# Patient Record
Sex: FEMALE | Race: BLACK OR AFRICAN AMERICAN | NOT HISPANIC OR LATINO | Employment: UNEMPLOYED | URBAN - METROPOLITAN AREA
[De-identification: names, ages, dates, MRNs, and addresses within clinical notes are randomized per-mention and may not be internally consistent; named-entity substitution may affect disease eponyms.]

---

## 2019-03-15 ENCOUNTER — OFFICE VISIT (OUTPATIENT)
Dept: RHEUMATOLOGY | Facility: CLINIC | Age: 49
End: 2019-03-15
Payer: COMMERCIAL

## 2019-03-15 VITALS
DIASTOLIC BLOOD PRESSURE: 70 MMHG | HEART RATE: 55 BPM | HEIGHT: 64 IN | TEMPERATURE: 97 F | RESPIRATION RATE: 16 BRPM | WEIGHT: 252 LBS | BODY MASS INDEX: 43.02 KG/M2 | SYSTOLIC BLOOD PRESSURE: 120 MMHG

## 2019-03-15 DIAGNOSIS — G89.4 CHRONIC PAIN SYNDROME: ICD-10-CM

## 2019-03-15 DIAGNOSIS — M25.9: ICD-10-CM

## 2019-03-15 DIAGNOSIS — G62.9 PERIPHERAL NERVE DISEASE: Primary | ICD-10-CM

## 2019-03-15 DIAGNOSIS — R21 RASH: ICD-10-CM

## 2019-03-15 DIAGNOSIS — L40.9 PSORIASIS: ICD-10-CM

## 2019-03-15 DIAGNOSIS — G35 MULTIPLE SCLEROSIS: ICD-10-CM

## 2019-03-15 DIAGNOSIS — M23.92 INTERNAL DERANGEMENT OF LEFT KNEE: ICD-10-CM

## 2019-03-15 DIAGNOSIS — M54.16 LUMBAR RADICULOPATHY: ICD-10-CM

## 2019-03-15 DIAGNOSIS — Q87.19 SJOGREN-LARSSON SYNDROME: ICD-10-CM

## 2019-03-15 DIAGNOSIS — R76.8 POSITIVE ANTINUCLEAR ANTIBODY: ICD-10-CM

## 2019-03-15 PROBLEM — M79.673 FOOT PAIN: Status: ACTIVE | Noted: 2019-03-15

## 2019-03-15 PROBLEM — I63.9 CEREBROVASCULAR ACCIDENT: Status: ACTIVE | Noted: 2019-03-15

## 2019-03-15 PROBLEM — G56.03 BILATERAL CARPAL TUNNEL SYNDROME: Status: ACTIVE | Noted: 2018-08-01

## 2019-03-15 PROBLEM — R25.2 SPASTICITY: Status: ACTIVE | Noted: 2017-10-23

## 2019-03-15 PROBLEM — G43.909 MIGRAINE: Status: ACTIVE | Noted: 2019-03-15

## 2019-03-15 PROBLEM — G47.00 PERSISTENT INSOMNIA: Status: ACTIVE | Noted: 2019-03-15

## 2019-03-15 PROBLEM — M35.00 SJOGREN'S SYNDROME: Status: ACTIVE | Noted: 2018-11-01

## 2019-03-15 PROBLEM — K21.9 GASTROESOPHAGEAL REFLUX DISEASE: Status: ACTIVE | Noted: 2017-10-23

## 2019-03-15 PROCEDURE — 3008F PR BODY MASS INDEX (BMI) DOCUMENTED: ICD-10-PCS | Mod: CPTII,S$GLB,, | Performed by: INTERNAL MEDICINE

## 2019-03-15 PROCEDURE — 99214 PR OFFICE/OUTPT VISIT, EST, LEVL IV, 30-39 MIN: ICD-10-PCS | Mod: S$GLB,,, | Performed by: INTERNAL MEDICINE

## 2019-03-15 PROCEDURE — 3008F BODY MASS INDEX DOCD: CPT | Mod: CPTII,S$GLB,, | Performed by: INTERNAL MEDICINE

## 2019-03-15 PROCEDURE — 99214 OFFICE O/P EST MOD 30 MIN: CPT | Mod: S$GLB,,, | Performed by: INTERNAL MEDICINE

## 2019-03-15 RX ORDER — ALBUTEROL SULFATE 90 UG/1
AEROSOL, METERED RESPIRATORY (INHALATION)
COMMUNITY

## 2019-03-15 RX ORDER — CARISOPRODOL 350 MG/1
TABLET ORAL
COMMUNITY
End: 2022-05-25 | Stop reason: CLARIF

## 2019-03-15 RX ORDER — LOSARTAN POTASSIUM AND HYDROCHLOROTHIAZIDE 12.5; 5 MG/1; MG/1
TABLET ORAL
COMMUNITY

## 2019-03-15 RX ORDER — MELOXICAM 15 MG/1
15 TABLET ORAL DAILY
Qty: 30 TABLET | Refills: 3 | Status: SHIPPED | OUTPATIENT
Start: 2019-03-15

## 2019-03-15 RX ORDER — HYDROCODONE BITARTRATE AND ACETAMINOPHEN 10; 325 MG/1; MG/1
TABLET ORAL
COMMUNITY
End: 2022-08-01

## 2019-03-15 RX ORDER — IBUPROFEN 800 MG/1
TABLET ORAL
COMMUNITY

## 2019-03-15 RX ORDER — DEXTROAMPHETAMINE SACCHARATE, AMPHETAMINE ASPARTATE MONOHYDRATE, DEXTROAMPHETAMINE SULFATE AND AMPHETAMINE SULFATE 7.5; 7.5; 7.5; 7.5 MG/1; MG/1; MG/1; MG/1
30 CAPSULE, EXTENDED RELEASE ORAL EVERY MORNING
COMMUNITY

## 2019-03-15 RX ORDER — LIDOCAINE 50 MG/G
PATCH TOPICAL
COMMUNITY
End: 2022-06-28

## 2019-03-15 RX ORDER — ALPRAZOLAM 1 MG/1
TABLET ORAL
COMMUNITY

## 2019-03-15 RX ORDER — PANTOPRAZOLE SODIUM 40 MG/1
TABLET, DELAYED RELEASE ORAL
COMMUNITY

## 2019-03-15 RX ORDER — ESTRADIOL 0.1 MG/D
FILM, EXTENDED RELEASE TRANSDERMAL
COMMUNITY
End: 2022-06-28 | Stop reason: ALTCHOICE

## 2019-03-15 RX ORDER — ESTRADIOL 0.5 MG/1
TABLET ORAL
COMMUNITY

## 2019-03-15 NOTE — PROGRESS NOTES
Subjective:       Patient ID: Greta Castillo is a 48 y.o. female.    Chief Complaint: Follow-up    HPI no acutely swollen tender painful joint since last visit and injection in the hips temporaly after one week and pain recurred Pain more severe when lying down resting  Worse pain or standing too long  And continue on soma  1 tab tid. Developed a rsh in elbow and both legs and saw a Dermatologist and no biopsy known and treated for two months with injections and told she has Psoriasis and no treatment  Thereafter. Has a leg  Rash  remainining in leg a circular band like rash for one month. Continue on avonex for multiple sclerosis. Dryness in mouth and mouth  On Visine. No Gi upset with Ibuprofen  800 Bid with no GI upset  With Protonix    Current medications include:  Current Outpatient Medications on File Prior to Visit   Medication Sig Dispense Refill    albuterol (VENTOLIN HFA) 90 mcg/actuation inhaler Ventolin HFA 90 mcg/actuation aerosol inhaler      ALPRAZolam (XANAX) 1 MG tablet alprazolam 1 mg tablet   Take 1 tablet 3 times a day by oral route.      carisoprodol (SOMA) 350 MG tablet Soma 350 mg tablet   Take 1 tablet 3 times a day by oral route.      dextroamphetamine-amphetamine (ADDERALL XR) 30 MG 24 hr capsule Take 30 mg by mouth every morning.      estradiol (ESTRACE) 0.5 MG tablet estradiol 0.5 mg tablet      estradiol (VIVELLE-DOT) 0.1 mg/24 hr PTSW estradiol 0.1 mg/24 hr semiweekly transdermal patch      HYDROcodone-acetaminophen (NORCO)  mg per tablet Take 1 tab QID      ibuprofen (ADVIL,MOTRIN) 800 MG tablet ibuprofen 800 mg tablet   TAKE ONE TABLET BY MOUTH THREE TIMES DAILY      interferon beta-1a, albumin, (AVONEX, WITH ALBUMIN,) 30 mcg injection Once a week      lidocaine (LIDODERM) 5 % Apply 1 patch over painful joint area 12 hrs on and 12 hrs off.      losartan-hydrochlorothiazide 50-12.5 mg (HYZAAR) 50-12.5 mg per tablet Take 1 tab once daily      pantoprazole (PROTONIX)  "40 MG tablet Take 1 tab once daily       No current facility-administered medications on file prior to visit.        Lab Results:  No results found for: WBC, RBC, HGB, HCT, MCV, COLORU, SPECGRAV, PHUR, WBCUR, NITRITE, GLUCOSEUR, KETONESU, BILIRUBINUR, RBCUR, NA, K, CL, CO2, GLU, BUN, CREATININE     Review of Systems   Constitutional:        Has night sweat   HENT:        Dry eyes and mouth not symptomatic   Eyes: Negative.         Blurred vison  Recently will see Eye Dr   Respiratory: Negative.    Cardiovascular: Negative.    Gastrointestinal: Negative.    Endocrine: Negative.    Genitourinary: Negative.    Musculoskeletal: Positive for arthralgias and back pain.   Allergic/Immunologic: Negative.    Neurological: Positive for numbness.        Feet plantar area numbness and tingling   Hematological: Bruises/bleeds easily.   Psychiatric/Behavioral: Negative.          Objective:   /70 (Patient Position: Sitting, BP Method: Large (Manual))   Pulse (!) 55   Temp 96.6 °F (35.9 °C) (Temporal)   Resp 16   Ht 5' 4" (1.626 m)   Wt 114.3 kg (252 lb)   BMI 43.26 kg/m²      Physical Exam   Constitutional: She is well-developed, well-nourished, and in no distress.   HENT:   Drtyness in eyes and mouth   Neck:   Shoulder neck trigger points   Cardiovascular: Normal rate and regular rhythm.    Pulmonary/Chest: Effort normal and breath sounds normal.   Abdominal: Soft. Bowel sounds are normal.       Right Side Rheumatological Exam     Examination finds the 2nd MCP, 3rd MCP and 4th MCP normal.    The patient is tender to palpation of the shoulder, elbow, wrist, knee and 1st MCP    Shoulder Exam   Tenderness Location: subacromion    Knee Exam   Tenderness Location: lateral joint line    Hip Exam   Tenderness Location: lateral and greater trochanter    Left Side Rheumatological Exam     The patient is tender to palpation of the shoulder, elbow, wrist, knee, 2nd MCP, 3rd MCP and 4th MCP.    Shoulder Exam   Tenderness " Location: subacromion    Knee Exam   Tenderness Location: lateral joint line    Hip Exam   Tenderness Location: greater trochanter and lateral      Neurological:   SLR =Positive   Skin: Skin is warm and dry. Rash noted.     Psychiatric:   Bilateral dark sking rash circular aroun legs   Musculoskeletal:   Polyarthralgia and tenderss in multiple joint wrist , elbow, soulder, hips and knees bilaterally           Assessment:       1. Peripheral nerve disease    2. Multiple sclerosis    3. Lumbar radiculopathy    4. Chronic pain syndrome    5. Rash    6. Positive antinuclear antibody    7. Internal derangement of left knee    8. Disorder of hip region            Plan:       Start wearing Carpal Tunnel Syndrome braces will start Meloxica in place of Ibuprofen .

## 2021-05-12 ENCOUNTER — PATIENT MESSAGE (OUTPATIENT)
Dept: RESEARCH | Facility: HOSPITAL | Age: 51
End: 2021-05-12

## 2021-09-09 ENCOUNTER — HISTORICAL (OUTPATIENT)
Dept: SURGERY | Facility: HOSPITAL | Age: 51
End: 2021-09-09

## 2021-09-16 ENCOUNTER — HISTORICAL (OUTPATIENT)
Dept: SURGERY | Facility: HOSPITAL | Age: 51
End: 2021-09-16

## 2022-04-09 ENCOUNTER — HISTORICAL (OUTPATIENT)
Dept: ADMINISTRATIVE | Facility: HOSPITAL | Age: 52
End: 2022-04-09
Payer: MEDICARE

## 2022-04-25 VITALS
HEIGHT: 64 IN | DIASTOLIC BLOOD PRESSURE: 86 MMHG | WEIGHT: 254.19 LBS | BODY MASS INDEX: 43.4 KG/M2 | SYSTOLIC BLOOD PRESSURE: 138 MMHG

## 2022-04-30 NOTE — OP NOTE
Patient:   Greta Castillo             MRN: [id number]            FIN: [id number]               Age:   50 years     Sex:  Female     :  1970   Associated Diagnoses:   None   Author:   Gokul Duong MD      DATE OF SURGERY:    21    SURGEON:  Gokul Duong MD    PREOPERATIVE DIAGNOSIS:  Lumbar radiculopathy.    POSTOPERATIVE DIAGNOSIS:  Lumbar radiculopathy.    PROCEDURE PERFORMED:  Fluoroscopically-guided transforaminal lumbar epidural injections at left L3-4 and left L5-S1.    EQUIPMENT USED:  Epidural tray.    DESCRIPTION OF PROCEDURE:  Following informed consent, the patient was prepped and draped in the usual sterile fashion.  I infiltrated the tissue overlying L3-4 and L5-S1 with local anesthetic.  Under fluoroscopic guidance, I advanced a 22-gauge 3.5 inch BD spinal needle into the epidural space via left transforaminal approaches.  Positioning was confirmed at each location with administration of contrast.  I then instilled divided between the 2 needles a combination of 160 mg Depo-Medrol and 1 mL of 5% dextrose in water.  I removed the needles and applied sterile dressings.  The patient tolerated the procedure well without apparent complication.    IMPRESSION:  Successful fluoroscopically-guided transforaminal lumbar epidural injection at left L3-4 and left L5-S1.

## 2022-04-30 NOTE — OP NOTE
Patient:   Greta Castillo             MRN: [id number]            FIN: [id number]               Age:   50 years     Sex:  Female     :  1970   Associated Diagnoses:   None   Author:   Gokul Duong MD      DATE OF SURGERY:  21        SURGEON:  Gokul Duong MD    PREOPERATIVE DIAGNOSIS:  Cervical radiculopathy.    POSTOPERATIVE DIAGNOSE:  Cervical radiculopathy.    PROCEDURE PERFORMED:  Fluoroscopically-guided intralaminar cervical epidural injection at 5-6.    EQUIPMENT USED:  Epidural tray.    DETAILED DESCRIPTION:  Following informed consent, the patient was prepped and draped in the usual sterile fashion.  I infiltrated the tissue overlying C5-6 with local anesthetic.  Under fluoroscopic guidance, I advanced a 22-gauge 3.5 inch BD spinal needle into the epidural space.  Positioning was confirmed with administration of contrast.  I then instilled a combination of 160 mg Depo-Medrol and 1 mL of 5% dextrose in water.  I removed the needle and applied a sterile dressing.  The patient tolerated the procedure well without apparent complication.    IMPRESSION:  Successful fluoroscopically-guided intralaminar cervical epidural injection at C5-6.

## 2022-05-25 DIAGNOSIS — M54.9 CHRONIC BACK PAIN, UNSPECIFIED BACK LOCATION, UNSPECIFIED BACK PAIN LATERALITY: Primary | ICD-10-CM

## 2022-05-25 DIAGNOSIS — G89.29 CHRONIC BACK PAIN, UNSPECIFIED BACK LOCATION, UNSPECIFIED BACK PAIN LATERALITY: Primary | ICD-10-CM

## 2022-05-25 RX ORDER — CARISOPRODOL 350 MG/1
1 TABLET ORAL DAILY
COMMUNITY
Start: 2022-02-16 | End: 2022-05-25 | Stop reason: CLARIF

## 2022-05-25 RX ORDER — OXYCODONE AND ACETAMINOPHEN 10; 325 MG/1; MG/1
1 TABLET ORAL 3 TIMES DAILY PRN
COMMUNITY
Start: 2022-04-25 | End: 2022-05-25 | Stop reason: SDUPTHER

## 2022-05-25 RX ORDER — OXYCODONE AND ACETAMINOPHEN 10; 325 MG/1; MG/1
1 TABLET ORAL 3 TIMES DAILY PRN
Qty: 90 TABLET | Refills: 0 | Status: SHIPPED | OUTPATIENT
Start: 2022-05-25 | End: 2022-06-28

## 2022-05-25 RX ORDER — CARISOPRODOL 350 MG/1
350 TABLET ORAL DAILY
Qty: 30 TABLET | Refills: 0 | Status: SHIPPED | OUTPATIENT
Start: 2022-05-25 | End: 2022-06-27 | Stop reason: SDUPTHER

## 2022-06-14 NOTE — PROGRESS NOTES
Subjective:      Patient ID: Greta Castillo is a 51 y.o. female.    Chief Complaint: Disease Management    HPI:   Includes joint pain without subjective swelling.   Antecedent event includes: None;  Pain location includes: hips, right knee, right ankle, feet, neck and back;  Gradual onset; beginning >years ago;  Constant ache and numbness, Moderate in severity,   Lasting >minutes, Worse during the daytime;   Improved with change in position;   Worsened with activity and rest;     Review of Systems   Constitutional: Negative for fever and unexpected weight change.   HENT: Positive for tinnitus. Negative for mouth sores and trouble swallowing.    Eyes: Negative for redness.   Respiratory: Negative for cough and shortness of breath.    Cardiovascular: Positive for leg swelling. Negative for chest pain.   Gastrointestinal: Negative for constipation and diarrhea.   Genitourinary: Negative for dysuria and genital sores.   Musculoskeletal: Positive for arthralgias, back pain, myalgias and neck pain.   Integumentary:  Negative for rash.   Neurological: Positive for numbness, headaches and memory loss.   Hematological: Bruises/bleeds easily.   Psychiatric/Behavioral: Positive for sleep disturbance. The patient is nervous/anxious.       Past Medical History:   Diagnosis Date    Acid reflux     Anemia     Anxiety     Arthritis     Asthma     Depression     Hypertension     Stroke      Past Surgical History:   Procedure Laterality Date    CARPAL TUNNEL RELEASE Left      SECTION      CYSTOCELE REPAIR      HYSTERECTOMY      KNEE SURGERY      TUBAL LIGATION        See the Assessment/Plan for further characterization of the HPI, ROS, Medical, Surgical, Family, and Social Histories including Tobacco use, Meds; all of which has been reviewed in Epic.    Medication List with Changes/Refills   Current Medications    ALBUTEROL (PROVENTIL/VENTOLIN HFA) 90 MCG/ACTUATION INHALER    Ventolin HFA 90 mcg/actuation  "aerosol inhaler    ALPRAZOLAM (XANAX) 1 MG TABLET    alprazolam 1 mg tablet   Take 1 tablet 3 times a day by oral route.    CARISOPRODOL (SOMA) 350 MG TABLET    Take 1 tablet (350 mg total) by mouth Daily.    DEXTROAMPHETAMINE-AMPHETAMINE (ADDERALL XR) 30 MG 24 HR CAPSULE    Take 30 mg by mouth every morning.    ESTRADIOL (ESTRACE) 0.5 MG TABLET    estradiol 0.5 mg tablet    HYDROCODONE-ACETAMINOPHEN (NORCO)  MG PER TABLET    Take 1 tab QID    IBUPROFEN (ADVIL,MOTRIN) 800 MG TABLET    ibuprofen 800 mg tablet   TAKE ONE TABLET BY MOUTH THREE TIMES DAILY    LOSARTAN-HYDROCHLOROTHIAZIDE 50-12.5 MG (HYZAAR) 50-12.5 MG PER TABLET    Take 1 tab once daily    MELOXICAM (MOBIC) 15 MG TABLET    Take 1 tablet (15 mg total) by mouth once daily.    PANTOPRAZOLE (PROTONIX) 40 MG TABLET    Take 1 tab once daily   Discontinued Medications    ESTRADIOL (VIVELLE-DOT) 0.1 MG/24 HR PTSW    estradiol 0.1 mg/24 hr semiweekly transdermal patch    INTERFERON BETA-1A, ALBUMIN, (AVONEX, WITH ALBUMIN,) 30 MCG INJECTION    Once a week    LIDOCAINE (LIDODERM) 5 %    Apply 1 patch over painful joint area 12 hrs on and 12 hrs off.    OXYCODONE-ACETAMINOPHEN (PERCOCET)  MG PER TABLET    Take 1 tablet by mouth 3 (three) times daily as needed for Pain.         Objective:   /88   Pulse 76   Resp 16   Ht 5' 3" (1.6 m)   Wt 116.6 kg (257 lb)   SpO2 98%   BMI 45.53 kg/m²   Physical Exam  Non-toxic appearance. No distress.   Normocephalic and atraumatic.   External ears normal.    Conjunctivae and EOM are normal. No scleral icterus.   palpable distal pulses.    No tachypnea or signs of respiratory distress.   Neurological: Oriented. Normal thought content. No cranial nerve deficit.   Skin is warm. No pallor.   Musculoskeletal: see below for further input. No overt synovitis.     No image results found.    No visits with results within 1 Year(s) from this visit.   Latest known visit with results is:   No results found for any " previous visit.        All of the data above and below has been reviewed by myself and any further interpretations will be reflected in the assessment and plan.   The data includes review of external notes, and independent interpretation of lab results, x-rays, and imaging reports.  Active inflammatory arthritis is an illness that poses a threat to bodily function and even a threat to life in some cases.  Drug therapy to treat inflammatory arthritis usually requires intensive monitoring for toxicity.    Review of patient's allergies indicates:  No Known Allergies  Assessment/Plan:       There is some history to suggest inflammatory arthritis. But more so possible sjogren's syndrome mentioned in Epic diagnosis but not mentioned in Dr. Arias's note    She tells me she did not f/u with Dr. Arias because she had flare of MS and other noted    There is also some mechanical and neurogenic pain.    Neuropathy 'peripheral nerve disease' per records    MS currently working with Neurology Dr. Sevilla    Prior carpal tunnel    Prior hip surgery    Dr. Sepulveda    Iliotibial band syndrome    Lumbar radiculopathy per limited records    Consider also working with Physical Medicine and Rehab MD if not already    She is already working with an interventional Pain MD in [place] s/p cervical ROGELIO that did not help    Has been managing with:    meloxicam and ibuprofen and tells me GI only wants her to use the ibuprofen seldomly as   she was found to have stomach irritation which she will plan to clarify and should not combine    Oxycodone 10mg 325mg    Soma 350mg    Xanax    adderall    Has been working with:    Multiple sclerosis per very limited records in Epic    Previously on Avonex    Now on infusion with Dr. Sevilla    Prior CVA     No overt synovitis    Verbal education    We will seek records from PCP and Neurology and prior imaging reports from Pain Management    Blood work to further evaluate    Revisit weeks    Contact  us prn      Records Dr. Arias 3/15/2019: ((no acutely swollen tender painful joint since last visit and injection in the hips temporaly after one week and pain recurred Pain more severe when lying down resting  Worse pain or standing too long  And continue on soma  1 tab tid. Developed a rsh in elbow and both legs and saw a Dermatologist and no biopsy known and treated for two months with injections and told she has Psoriasis and no treatment  Thereafter. Has a leg  Rash  remainining in leg a circular band like rash for one month. Continue on avonex for multiple sclerosis. Dryness in mouth and mouth  On Visine. No Gi upset with Ibuprofen  800 Bid with no GI upset  With Protonix  Assessment:       1. Peripheral nerve disease    2. Multiple sclerosis    3. Lumbar radiculopathy    4. Chronic pain syndrome    5. Rash    6. Positive antinuclear antibody    7. Internal derangement of left knee    8. Disorder of hip region              Plan:       Start wearing Carpal Tunnel Syndrome braces will start Meloxica in place of Ibuprofen .    ))))      Review of medical records as stated above.  Lab +/- imaging and other ordered diagnostic studies to further evaluate.  See the orders associated with this note visit.  Medications as prescribed as tolerated.    Education including verbal and those noted in the patient instructions.  Revisit as scheduled and call prn.    The following diagnoses influence medical decision making and/or need further workup including the orders listed below:    Pain in other joint  -     CAITLIN by IFA, w/Rflx; Future; Expected date: 06/28/2022  -     Protein Electrophoresis, Serum; Future; Expected date: 06/28/2022  -     Quantiferon Gold TB; Future; Expected date: 06/28/2022  -     Rheumatoid Factor; Future; Expected date: 06/28/2022  -     Sedimentation rate; Future; Expected date: 06/28/2022  -     Uric Acid; Future; Expected date: 06/28/2022  -     Urinalysis; Future; Expected date: 06/28/2022  -      Cyclic Citrullinated Peptide Antibody, IgG; Future; Expected date: 06/28/2022  -     C-Reactive Protein; Future; Expected date: 06/28/2022  -     Comprehensive Metabolic Panel; Future; Expected date: 06/28/2022  -     CK; Future; Expected date: 06/28/2022  -     CBC Auto Differential; Future; Expected date: 06/28/2022  -     C4 Complement; Future; Expected date: 06/28/2022  -     C3 Complement; Future; Expected date: 06/28/2022  -     Hepatitis B Surface Antigen; Future; Expected date: 06/28/2022  -     Hepatitis B Core Antibody, Total; Future; Expected date: 06/28/2022  -     Hepatitis B Surface Ab, Qualitative; Future; Expected date: 06/28/2022  -     Hepatitis C Antibody; Future; Expected date: 06/28/2022    Polyarthralgia  -     CAITLIN by IFA, w/Rflx; Future; Expected date: 06/28/2022  -     Protein Electrophoresis, Serum; Future; Expected date: 06/28/2022  -     Quantiferon Gold TB; Future; Expected date: 06/28/2022  -     Rheumatoid Factor; Future; Expected date: 06/28/2022  -     Sedimentation rate; Future; Expected date: 06/28/2022  -     Uric Acid; Future; Expected date: 06/28/2022  -     Urinalysis; Future; Expected date: 06/28/2022  -     Cyclic Citrullinated Peptide Antibody, IgG; Future; Expected date: 06/28/2022  -     C-Reactive Protein; Future; Expected date: 06/28/2022  -     Comprehensive Metabolic Panel; Future; Expected date: 06/28/2022  -     CK; Future; Expected date: 06/28/2022  -     CBC Auto Differential; Future; Expected date: 06/28/2022  -     C4 Complement; Future; Expected date: 06/28/2022  -     C3 Complement; Future; Expected date: 06/28/2022  -     Hepatitis B Surface Antigen; Future; Expected date: 06/28/2022  -     Hepatitis B Core Antibody, Total; Future; Expected date: 06/28/2022  -     Hepatitis B Surface Ab, Qualitative; Future; Expected date: 06/28/2022  -     Hepatitis C Antibody; Future; Expected date: 06/28/2022    Cervical spondylosis    Other osteoarthritis involving  multiple joints  -     CAITLIN by IFA, w/Rflx; Future; Expected date: 06/28/2022  -     Protein Electrophoresis, Serum; Future; Expected date: 06/28/2022  -     Quantiferon Gold TB; Future; Expected date: 06/28/2022  -     Rheumatoid Factor; Future; Expected date: 06/28/2022  -     Sedimentation rate; Future; Expected date: 06/28/2022  -     Uric Acid; Future; Expected date: 06/28/2022  -     Urinalysis; Future; Expected date: 06/28/2022  -     Cyclic Citrullinated Peptide Antibody, IgG; Future; Expected date: 06/28/2022  -     C-Reactive Protein; Future; Expected date: 06/28/2022  -     Comprehensive Metabolic Panel; Future; Expected date: 06/28/2022  -     CK; Future; Expected date: 06/28/2022  -     CBC Auto Differential; Future; Expected date: 06/28/2022  -     C4 Complement; Future; Expected date: 06/28/2022  -     C3 Complement; Future; Expected date: 06/28/2022  -     Hepatitis B Surface Antigen; Future; Expected date: 06/28/2022  -     Hepatitis B Core Antibody, Total; Future; Expected date: 06/28/2022  -     Hepatitis B Surface Ab, Qualitative; Future; Expected date: 06/28/2022  -     Hepatitis C Antibody; Future; Expected date: 06/28/2022    Lumbar degenerative disc disease    Multiple sclerosis    Neuralgia    Myalgia    Screening for rheumatic disorder  -     CAITLIN by IFA, w/Rflx; Future; Expected date: 06/28/2022  -     Protein Electrophoresis, Serum; Future; Expected date: 06/28/2022  -     Quantiferon Gold TB; Future; Expected date: 06/28/2022  -     Rheumatoid Factor; Future; Expected date: 06/28/2022  -     Sedimentation rate; Future; Expected date: 06/28/2022  -     Uric Acid; Future; Expected date: 06/28/2022  -     Urinalysis; Future; Expected date: 06/28/2022  -     Cyclic Citrullinated Peptide Antibody, IgG; Future; Expected date: 06/28/2022  -     C-Reactive Protein; Future; Expected date: 06/28/2022  -     Comprehensive Metabolic Panel; Future; Expected date: 06/28/2022  -     CK; Future; Expected  date: 06/28/2022  -     CBC Auto Differential; Future; Expected date: 06/28/2022  -     C4 Complement; Future; Expected date: 06/28/2022  -     C3 Complement; Future; Expected date: 06/28/2022  -     Hepatitis B Surface Antigen; Future; Expected date: 06/28/2022  -     Hepatitis B Core Antibody, Total; Future; Expected date: 06/28/2022  -     Hepatitis B Surface Ab, Qualitative; Future; Expected date: 06/28/2022  -     Hepatitis C Antibody; Future; Expected date: 06/28/2022    History of carpal tunnel syndrome    Screening-pulmonary TB  -     CAITLIN by IFA, w/Rflx; Future; Expected date: 06/28/2022  -     Protein Electrophoresis, Serum; Future; Expected date: 06/28/2022  -     Quantiferon Gold TB; Future; Expected date: 06/28/2022  -     Rheumatoid Factor; Future; Expected date: 06/28/2022  -     Sedimentation rate; Future; Expected date: 06/28/2022  -     Uric Acid; Future; Expected date: 06/28/2022  -     Urinalysis; Future; Expected date: 06/28/2022  -     Cyclic Citrullinated Peptide Antibody, IgG; Future; Expected date: 06/28/2022  -     C-Reactive Protein; Future; Expected date: 06/28/2022  -     Comprehensive Metabolic Panel; Future; Expected date: 06/28/2022  -     CK; Future; Expected date: 06/28/2022  -     CBC Auto Differential; Future; Expected date: 06/28/2022  -     C4 Complement; Future; Expected date: 06/28/2022  -     C3 Complement; Future; Expected date: 06/28/2022  -     Hepatitis B Surface Antigen; Future; Expected date: 06/28/2022  -     Hepatitis B Core Antibody, Total; Future; Expected date: 06/28/2022  -     Hepatitis B Surface Ab, Qualitative; Future; Expected date: 06/28/2022  -     Hepatitis C Antibody; Future; Expected date: 06/28/2022    Encounter for hepatitis C screening test for low risk patient  -     CAITLIN by IFA, w/Rflx; Future; Expected date: 06/28/2022  -     Protein Electrophoresis, Serum; Future; Expected date: 06/28/2022  -     Quantiferon Gold TB; Future; Expected date:  06/28/2022  -     Rheumatoid Factor; Future; Expected date: 06/28/2022  -     Sedimentation rate; Future; Expected date: 06/28/2022  -     Uric Acid; Future; Expected date: 06/28/2022  -     Urinalysis; Future; Expected date: 06/28/2022  -     Cyclic Citrullinated Peptide Antibody, IgG; Future; Expected date: 06/28/2022  -     C-Reactive Protein; Future; Expected date: 06/28/2022  -     Comprehensive Metabolic Panel; Future; Expected date: 06/28/2022  -     CK; Future; Expected date: 06/28/2022  -     CBC Auto Differential; Future; Expected date: 06/28/2022  -     C4 Complement; Future; Expected date: 06/28/2022  -     C3 Complement; Future; Expected date: 06/28/2022  -     Hepatitis B Surface Antigen; Future; Expected date: 06/28/2022  -     Hepatitis B Core Antibody, Total; Future; Expected date: 06/28/2022  -     Hepatitis B Surface Ab, Qualitative; Future; Expected date: 06/28/2022  -     Hepatitis C Antibody; Future; Expected date: 06/28/2022      Follow up in about 4 weeks (around 7/26/2022).     Stevan Pompa MD

## 2022-06-27 DIAGNOSIS — M54.9 CHRONIC BACK PAIN, UNSPECIFIED BACK LOCATION, UNSPECIFIED BACK PAIN LATERALITY: ICD-10-CM

## 2022-06-27 DIAGNOSIS — G89.29 CHRONIC BACK PAIN, UNSPECIFIED BACK LOCATION, UNSPECIFIED BACK PAIN LATERALITY: ICD-10-CM

## 2022-06-28 ENCOUNTER — OFFICE VISIT (OUTPATIENT)
Dept: RHEUMATOLOGY | Facility: CLINIC | Age: 52
End: 2022-06-28
Payer: MEDICARE

## 2022-06-28 VITALS
DIASTOLIC BLOOD PRESSURE: 88 MMHG | HEIGHT: 63 IN | OXYGEN SATURATION: 98 % | BODY MASS INDEX: 45.54 KG/M2 | SYSTOLIC BLOOD PRESSURE: 132 MMHG | RESPIRATION RATE: 16 BRPM | WEIGHT: 257 LBS | HEART RATE: 76 BPM

## 2022-06-28 DIAGNOSIS — M51.36 LUMBAR DEGENERATIVE DISC DISEASE: ICD-10-CM

## 2022-06-28 DIAGNOSIS — M15.8 OTHER OSTEOARTHRITIS INVOLVING MULTIPLE JOINTS: ICD-10-CM

## 2022-06-28 DIAGNOSIS — Z11.59 ENCOUNTER FOR HEPATITIS C SCREENING TEST FOR LOW RISK PATIENT: ICD-10-CM

## 2022-06-28 DIAGNOSIS — M25.59 PAIN IN OTHER JOINT: Primary | ICD-10-CM

## 2022-06-28 DIAGNOSIS — M79.10 MYALGIA: ICD-10-CM

## 2022-06-28 DIAGNOSIS — M47.812 CERVICAL SPONDYLOSIS: ICD-10-CM

## 2022-06-28 DIAGNOSIS — Z13.89 SCREENING FOR RHEUMATIC DISORDER: ICD-10-CM

## 2022-06-28 DIAGNOSIS — G35 MULTIPLE SCLEROSIS: ICD-10-CM

## 2022-06-28 DIAGNOSIS — Z11.1 SCREENING-PULMONARY TB: ICD-10-CM

## 2022-06-28 DIAGNOSIS — M79.2 NEURALGIA: ICD-10-CM

## 2022-06-28 DIAGNOSIS — M25.50 POLYARTHRALGIA: ICD-10-CM

## 2022-06-28 DIAGNOSIS — Z86.69 HISTORY OF CARPAL TUNNEL SYNDROME: ICD-10-CM

## 2022-06-28 PROCEDURE — 99204 OFFICE O/P NEW MOD 45 MIN: CPT | Mod: S$GLB,,, | Performed by: INTERNAL MEDICINE

## 2022-06-28 PROCEDURE — 99204 PR OFFICE/OUTPT VISIT, NEW, LEVL IV, 45-59 MIN: ICD-10-PCS | Mod: S$GLB,,, | Performed by: INTERNAL MEDICINE

## 2022-06-29 ENCOUNTER — TELEPHONE (OUTPATIENT)
Dept: NEUROLOGY | Facility: CLINIC | Age: 52
End: 2022-06-29
Payer: MEDICARE

## 2022-06-29 RX ORDER — OXYCODONE AND ACETAMINOPHEN 10; 325 MG/1; MG/1
1 TABLET ORAL 3 TIMES DAILY PRN
Qty: 90 TABLET | Refills: 0 | Status: SHIPPED | OUTPATIENT
Start: 2022-06-29 | End: 2022-08-01 | Stop reason: SDUPTHER

## 2022-06-29 RX ORDER — CARISOPRODOL 350 MG/1
350 TABLET ORAL DAILY
Qty: 30 TABLET | Refills: 0 | Status: SHIPPED | OUTPATIENT
Start: 2022-06-29

## 2022-07-18 NOTE — PROGRESS NOTES
Subjective:      Patient ID: Greta Castillo is a 51 y.o. female.    Chief Complaint: Disease Management    HPI:   Includes joint pain without subjective swelling.   Antecedent event includes: None;  Pain location includes: joints;  Gradual onset; beginning >years ago;  Constant ache, Mild in severity,   Lasting >minutes, Worse during the daytime;   Improved with rest, medication, change in position and none;   Worsened with activity and overuse;     Review of Systems   Constitutional: Negative for chills, fatigue and fever.   HENT: Positive for tinnitus. Negative for nasal congestion, ear pain, hearing loss, mouth dryness, mouth sores, nosebleeds and trouble swallowing.    Eyes: Negative for photophobia, pain, redness, visual disturbance and eye dryness.   Respiratory: Negative for cough and shortness of breath.    Cardiovascular: Negative for chest pain, palpitations and leg swelling.   Gastrointestinal: Negative for abdominal distention, abdominal pain, blood in stool, constipation, diarrhea, rectal pain, vomiting and fecal incontinence.   Endocrine: Negative for polydipsia and polyuria.   Genitourinary: Negative for bladder incontinence, dysuria, enuresis, genital sores and hematuria.   Musculoskeletal: Positive for arthralgias and myalgias. Negative for joint swelling.   Integumentary:  Negative for rash, wound and mole/lesion.   Neurological: Positive for dizziness, headaches and memory loss. Negative for weakness.   Hematological: Negative for adenopathy. Bruises/bleeds easily.   Psychiatric/Behavioral: Positive for sleep disturbance. Negative for dysphoric mood. The patient is nervous/anxious.       Past Medical History:   Diagnosis Date    Acid reflux     Anemia     Anxiety     Arthritis     Asthma     Depression     Hypertension     Stroke      Past Surgical History:   Procedure Laterality Date    CARPAL TUNNEL RELEASE Left      SECTION      CYSTOCELE REPAIR      HYSTERECTOMY       "KNEE SURGERY      TUBAL LIGATION        See the Assessment/Plan for further characterization of the HPI, ROS, Medical, Surgical, Family, and Social Histories including Tobacco use, Meds; all of which has been reviewed in Epic.    Medication List with Changes/Refills   New Medications    POTASSIUM CHLORIDE SA (K-DUR,KLOR-CON) 20 MEQ TABLET    Take 1 tablet (20 mEq total) by mouth once daily.   Current Medications    ALBUTEROL (PROVENTIL/VENTOLIN HFA) 90 MCG/ACTUATION INHALER    Ventolin HFA 90 mcg/actuation aerosol inhaler    ALPRAZOLAM (XANAX) 1 MG TABLET    alprazolam 1 mg tablet   Take 1 tablet 3 times a day by oral route.    CARISOPRODOL (SOMA) 350 MG TABLET    Take 1 tablet (350 mg total) by mouth Daily.    DEXTROAMPHETAMINE-AMPHETAMINE (ADDERALL XR) 30 MG 24 HR CAPSULE    Take 30 mg by mouth every morning.    ESTRADIOL (ESTRACE) 0.5 MG TABLET    estradiol 0.5 mg tablet    IBUPROFEN (ADVIL,MOTRIN) 800 MG TABLET    ibuprofen 800 mg tablet   TAKE ONE TABLET BY MOUTH THREE TIMES DAILY    LOSARTAN-HYDROCHLOROTHIAZIDE 50-12.5 MG (HYZAAR) 50-12.5 MG PER TABLET    Take 1 tab once daily    MELOXICAM (MOBIC) 15 MG TABLET    Take 1 tablet (15 mg total) by mouth once daily.    OXYCODONE-ACETAMINOPHEN (PERCOCET) 5-325 MG PER TABLET        PANTOPRAZOLE (PROTONIX) 40 MG TABLET    Take 1 tab once daily   Discontinued Medications    HYDROCODONE-ACETAMINOPHEN (NORCO)  MG PER TABLET    Take 1 tab QID    OXYCODONE-ACETAMINOPHEN (PERCOCET)  MG PER TABLET    Take 1 tablet by mouth 3 (three) times daily as needed for Pain.         Objective:   /67   Pulse 72   Resp 16   Ht 5' 3" (1.6 m)   Wt 117.9 kg (260 lb)   SpO2 98%   BMI 46.06 kg/m²   Physical Exam  Non-toxic appearance. No distress.   Normocephalic and atraumatic.   External ears normal.    Conjunctivae and EOM are normal. No scleral icterus.   RRR, No friction rub; palpable distal pulses.    No tachypnea or signs of respiratory distress. " CTAB.  Abdominal: No guarding or rebound tenderness.   Neurological: Oriented. Normal thought content. No cranial nerve deficit. Strength is grossly normal without focal deficit.  Skin is warm. No pallor.   Musculoskeletal: DJD. see below for further input. No overt synovitis.     No image results found.    No visits with results within 1 Year(s) from this visit.   Latest known visit with results is:   No results found for any previous visit.        All of the data above and below has been reviewed by myself and any further interpretations will be reflected in the assessment and plan.   The data includes review of external notes, and independent interpretation of lab results, x-rays, and imaging reports.  Active inflammatory arthritis is an illness that poses a threat to bodily function and even a threat to life in some cases.  Drug therapy to treat inflammatory arthritis usually requires intensive monitoring for toxicity.    Review of patient's allergies indicates:  No Known Allergies  Assessment/Plan:       Prev noted/prior plan:   (No overt synovitis     Verbal education     We will seek records from PCP and Neurology and prior imaging reports from Pain Management     Blood work to further evaluate     Revisit weeks     Contact us prn)    This visit:    Interim lab 7/12/22:  SPEP alpha 2 borderline high 1.18;  Ig levels normal    CPK 60    C3 191; C4 19    WBC 9.6; Hgb 12.4; plt 371    UA SG 1.02    Creat 0.78; alb 4.1    K 3.4    CAITLIN neg RF neg CCP neg HepBsAgnegsAbneg coreAb neg HCV neg  Uric acid 5.3    2022 TB negative    ESR 48 CRP not done?    Interim records from PCP and Neurology and prior imaging reports from Pain Management:     Records PCP Dr. Rendon some of note 7/12/22: A/P: anxiety, xanax; ADHD amphetamine; insomnia zolpidem 10mg; Multiple sclerosis; MRI brain; OA right knee; refer to pain management    Records Martha Brown NP Cardiology some of note 6/21/22: revisit HTN, she was seen month  prior during acute illness with bronchitis; was SOB and chest discomfort; sent ER and ruled out MI; I/P: HTN, HLD, MS excerbation; Multiple sclerosis; monitor BP at home and report back if consistently greater than 140/90; revisit 6 months    3/21/22 MRI brain: linear bands of high signal intensity along septal callosal interface and bilateral; demyelinating lesions without enhancement consistent with lack of flare up.    7/27/22 MRI brain: continued areas of increased signal in white matter consistent with MS similar to prior exam    Records Neurology Dr. Sevilla some of note 2/28/22:  Relapsing/remitting MS; currently receiving Ocrevus infusions every 6 months; I/P: Multiple sclerosis; cont Ocrevus infusions every 6 months; Occipital neuralgia; MRI cervical spine; start carbamazepine 200mg bid    2/23/22 EMG/NCS UE: mild carpal tunnel       meloxicam and ibuprofen and tells me GI only wants her to use the ibuprofen seldomly as   she was found to have stomach irritation which she will plan to clarify and should not combine   Oxycodone 10mg 325mg   Soma 350mg   Xanax   adderall    She has some interim pain in joints and muscles    No overt synovitis    Went over lab and records: it looks like she is rather getting Ocrevus infusions every 6 months? She tells me yes    I am not seeing active inflammatory arthritis or connective tissue disease on exam or lab noted.    Verbal education fairly extensive on optimizing management of her mechanical pain; The medications only help so much.    Will give her temporary K    Revisit lab 2 weeks prior    Contact us prn        CAITLIN neg RF neg CCP neg HepBsAgnegsAbneg coreAb neg HCV neg  Uric acid 5.3  There is some history to suggest inflammatory arthritis. But more so possible sjogren's syndrome mentioned in Epic diagnosis but not mentioned in Dr. Arias's note     She tells me she did not f/u with Dr. Arias because she had flare of MS and other noted     There is also some  mechanical and neurogenic pain.     Neuropathy 'peripheral nerve disease' per records     MS currently working with Neurology Dr. Sevilla     Prior carpal tunnel     Prior hip surgery     Dr. Sepulveda     Iliotibial band syndrome     Lumbar radiculopathy per limited records     Consider also working with Physical Medicine and Rehab MD if not already     She is already working with an interventional Pain MD in [place] s/p cervical ROGELIO that did not help     Has been managing with:     meloxicam and ibuprofen and tells me GI only wants her to use the ibuprofen seldomly as   she was found to have stomach irritation which she will plan to clarify and should not combine     Oxycodone 10mg 325mg     Soma 350mg     Xanax     adderall     Has been working with:     Multiple sclerosis per very limited records in Epic     Previously on Avonex     Now on infusion with Dr. Sevilla     Ocrevus infusions every 6 months    Prior CVA     Prev noted/prior plan:   (No overt synovitis     Verbal education     We will seek records from PCP and Neurology and prior imaging reports from Pain Management     Blood work to further evaluate     Revisit weeks     Contact us prn)        Records Dr. Arias 3/15/2019: ((no acutely swollen tender painful joint since last visit and injection in the hips temporaly after one week and pain recurred Pain more severe when lying down resting  Worse pain or standing too long  And continue on soma  1 tab tid. Developed a rsh in elbow and both legs and saw a Dermatologist and no biopsy known and treated for two months with injections and told she has Psoriasis and no treatment  Thereafter. Has a leg  Rash  remainining in leg a circular band like rash for one month. Continue on avonex for multiple sclerosis. Dryness in mouth and mouth  On Visine. No Gi upset with Ibuprofen  800 Bid with no GI upset  With Protonix  Assessment:       1. Peripheral nerve disease    2. Multiple sclerosis    3. Lumbar  radiculopathy    4. Chronic pain syndrome    5. Rash    6. Positive antinuclear antibody    7. Internal derangement of left knee    8. Disorder of hip region              Plan:       Start wearing Carpal Tunnel Syndrome braces will start Meloxica in place of Ibuprofen .    ))))     Review of medical records as stated above.  Lab +/- imaging and other ordered diagnostic studies to further evaluate.  See the orders associated with this note visit.  Medications as prescribed as tolerated.    Education including verbal and those noted in the patient instructions.  Revisit as scheduled and call prn.    The following diagnoses influence medical decision making and/or need further workup including the orders listed below:    Pain in other joint  -     CAITLIN by IFA, w/Rflx; Future; Expected date: 11/16/2022  -     Comprehensive Metabolic Panel; Future; Expected date: 11/16/2022  -     C-Reactive Protein; Future; Expected date: 11/16/2022  -     C3 Complement; Future; Expected date: 11/16/2022  -     C4 Complement; Future; Expected date: 11/16/2022  -     CBC Auto Differential; Future; Expected date: 11/16/2022  -     Urinalysis Microscopic; Future; Expected date: 11/16/2022  -     Sedimentation rate; Future; Expected date: 11/16/2022  -     CK; Future; Expected date: 11/16/2022    Polyarthralgia  -     CAITLIN by IFA, w/Rflx; Future; Expected date: 11/16/2022  -     Comprehensive Metabolic Panel; Future; Expected date: 11/16/2022  -     C-Reactive Protein; Future; Expected date: 11/16/2022  -     C3 Complement; Future; Expected date: 11/16/2022  -     C4 Complement; Future; Expected date: 11/16/2022  -     CBC Auto Differential; Future; Expected date: 11/16/2022  -     Urinalysis Microscopic; Future; Expected date: 11/16/2022  -     Sedimentation rate; Future; Expected date: 11/16/2022  -     CK; Future; Expected date: 11/16/2022    Cervical spondylosis  -     CAITLIN by IFA, w/Rflx; Future; Expected date: 11/16/2022  -      Comprehensive Metabolic Panel; Future; Expected date: 11/16/2022  -     C-Reactive Protein; Future; Expected date: 11/16/2022  -     C3 Complement; Future; Expected date: 11/16/2022  -     C4 Complement; Future; Expected date: 11/16/2022  -     CBC Auto Differential; Future; Expected date: 11/16/2022  -     Urinalysis Microscopic; Future; Expected date: 11/16/2022  -     Sedimentation rate; Future; Expected date: 11/16/2022  -     CK; Future; Expected date: 11/16/2022    Lumbar degenerative disc disease  -     CAITLIN by IFA, w/Rflx; Future; Expected date: 11/16/2022  -     Comprehensive Metabolic Panel; Future; Expected date: 11/16/2022  -     C-Reactive Protein; Future; Expected date: 11/16/2022  -     C3 Complement; Future; Expected date: 11/16/2022  -     C4 Complement; Future; Expected date: 11/16/2022  -     CBC Auto Differential; Future; Expected date: 11/16/2022  -     Urinalysis Microscopic; Future; Expected date: 11/16/2022  -     Sedimentation rate; Future; Expected date: 11/16/2022  -     CK; Future; Expected date: 11/16/2022    Other osteoarthritis involving multiple joints  -     CAITLIN by IFA, w/Rflx; Future; Expected date: 11/16/2022  -     Comprehensive Metabolic Panel; Future; Expected date: 11/16/2022  -     C-Reactive Protein; Future; Expected date: 11/16/2022  -     C3 Complement; Future; Expected date: 11/16/2022  -     C4 Complement; Future; Expected date: 11/16/2022  -     CBC Auto Differential; Future; Expected date: 11/16/2022  -     Urinalysis Microscopic; Future; Expected date: 11/16/2022  -     Sedimentation rate; Future; Expected date: 11/16/2022  -     CK; Future; Expected date: 11/16/2022    Multiple sclerosis  -     CAITLIN by IFA, w/Rflx; Future; Expected date: 11/16/2022  -     Comprehensive Metabolic Panel; Future; Expected date: 11/16/2022  -     C-Reactive Protein; Future; Expected date: 11/16/2022  -     C3 Complement; Future; Expected date: 11/16/2022  -     C4 Complement; Future;  Expected date: 11/16/2022  -     CBC Auto Differential; Future; Expected date: 11/16/2022  -     Urinalysis Microscopic; Future; Expected date: 11/16/2022  -     Sedimentation rate; Future; Expected date: 11/16/2022  -     CK; Future; Expected date: 11/16/2022    Myalgia  -     CAITLIN by IFA, w/Rflx; Future; Expected date: 11/16/2022  -     Comprehensive Metabolic Panel; Future; Expected date: 11/16/2022  -     C-Reactive Protein; Future; Expected date: 11/16/2022  -     C3 Complement; Future; Expected date: 11/16/2022  -     C4 Complement; Future; Expected date: 11/16/2022  -     CBC Auto Differential; Future; Expected date: 11/16/2022  -     Urinalysis Microscopic; Future; Expected date: 11/16/2022  -     Sedimentation rate; Future; Expected date: 11/16/2022  -     CK; Future; Expected date: 11/16/2022    Screening for rheumatic disorder  -     CAITLIN by IFA w/Rflx; Future; Expected date: 11/16/2022  -     Comprehensive Metabolic Panel; Future; Expected date: 11/16/2022  -     C-Reactive Protein; Future; Expected date: 11/16/2022  -     C3 Complement; Future; Expected date: 11/16/2022  -     C4 Complement; Future; Expected date: 11/16/2022  -     CBC Auto Differential; Future; Expected date: 11/16/2022  -     Urinalysis Microscopic; Future; Expected date: 11/16/2022  -     Sedimentation rate; Future; Expected date: 11/16/2022  -     CK; Future; Expected date: 11/16/2022    Neuralgia  -     CAITLIN by IFA, w/Rflx; Future; Expected date: 11/16/2022  -     Comprehensive Metabolic Panel; Future; Expected date: 11/16/2022  -     C-Reactive Protein; Future; Expected date: 11/16/2022  -     C3 Complement; Future; Expected date: 11/16/2022  -     C4 Complement; Future; Expected date: 11/16/2022  -     CBC Auto Differential; Future; Expected date: 11/16/2022  -     Urinalysis Microscopic; Future; Expected date: 11/16/2022  -     Sedimentation rate; Future; Expected date: 11/16/2022  -     CK; Future; Expected date:  11/16/2022    History of carpal tunnel syndrome  -     CAITLIN by IFA, w/Rflx; Future; Expected date: 11/16/2022  -     Comprehensive Metabolic Panel; Future; Expected date: 11/16/2022  -     C-Reactive Protein; Future; Expected date: 11/16/2022  -     C3 Complement; Future; Expected date: 11/16/2022  -     C4 Complement; Future; Expected date: 11/16/2022  -     CBC Auto Differential; Future; Expected date: 11/16/2022  -     Urinalysis Microscopic; Future; Expected date: 11/16/2022  -     Sedimentation rate; Future; Expected date: 11/16/2022  -     CK; Future; Expected date: 11/16/2022    Hypokalemia  -     CAITLIN by IFA, w/Rflx; Future; Expected date: 11/16/2022  -     Comprehensive Metabolic Panel; Future; Expected date: 11/16/2022  -     C-Reactive Protein; Future; Expected date: 11/16/2022  -     C3 Complement; Future; Expected date: 11/16/2022  -     C4 Complement; Future; Expected date: 11/16/2022  -     CBC Auto Differential; Future; Expected date: 11/16/2022  -     Urinalysis Microscopic; Future; Expected date: 11/16/2022  -     Sedimentation rate; Future; Expected date: 11/16/2022  -     CK; Future; Expected date: 11/16/2022  -     potassium chloride SA (K-DUR,KLOR-CON) 20 MEQ tablet; Take 1 tablet (20 mEq total) by mouth once daily.  Dispense: 60 tablet; Refill: 0      Follow up in about 4 months (around 12/1/2022).     Stevan Pompa MD

## 2022-08-01 ENCOUNTER — OFFICE VISIT (OUTPATIENT)
Dept: RHEUMATOLOGY | Facility: CLINIC | Age: 52
End: 2022-08-01
Payer: MEDICARE

## 2022-08-01 VITALS
DIASTOLIC BLOOD PRESSURE: 67 MMHG | HEART RATE: 72 BPM | HEIGHT: 63 IN | BODY MASS INDEX: 46.07 KG/M2 | RESPIRATION RATE: 16 BRPM | SYSTOLIC BLOOD PRESSURE: 126 MMHG | OXYGEN SATURATION: 98 % | WEIGHT: 260 LBS

## 2022-08-01 DIAGNOSIS — M51.36 LUMBAR DEGENERATIVE DISC DISEASE: ICD-10-CM

## 2022-08-01 DIAGNOSIS — Z86.69 HISTORY OF CARPAL TUNNEL SYNDROME: ICD-10-CM

## 2022-08-01 DIAGNOSIS — M15.8 OTHER OSTEOARTHRITIS INVOLVING MULTIPLE JOINTS: ICD-10-CM

## 2022-08-01 DIAGNOSIS — M25.59 PAIN IN OTHER JOINT: Primary | ICD-10-CM

## 2022-08-01 DIAGNOSIS — M25.50 POLYARTHRALGIA: ICD-10-CM

## 2022-08-01 DIAGNOSIS — Z13.89 SCREENING FOR RHEUMATIC DISORDER: ICD-10-CM

## 2022-08-01 DIAGNOSIS — G35 MULTIPLE SCLEROSIS: ICD-10-CM

## 2022-08-01 DIAGNOSIS — E87.6 HYPOKALEMIA: ICD-10-CM

## 2022-08-01 DIAGNOSIS — M79.10 MYALGIA: ICD-10-CM

## 2022-08-01 DIAGNOSIS — M79.2 NEURALGIA: ICD-10-CM

## 2022-08-01 DIAGNOSIS — M47.812 CERVICAL SPONDYLOSIS: ICD-10-CM

## 2022-08-01 PROCEDURE — 99214 OFFICE O/P EST MOD 30 MIN: CPT | Mod: S$GLB,,, | Performed by: INTERNAL MEDICINE

## 2022-08-01 PROCEDURE — 99214 PR OFFICE/OUTPT VISIT, EST, LEVL IV, 30-39 MIN: ICD-10-PCS | Mod: S$GLB,,, | Performed by: INTERNAL MEDICINE

## 2022-08-01 RX ORDER — POTASSIUM CHLORIDE 20 MEQ/1
20 TABLET, EXTENDED RELEASE ORAL DAILY
Qty: 60 TABLET | Refills: 0 | Status: SHIPPED | OUTPATIENT
Start: 2022-08-01 | End: 2022-09-30

## 2022-08-01 RX ORDER — OXYCODONE AND ACETAMINOPHEN 5; 325 MG/1; MG/1
TABLET ORAL
COMMUNITY
Start: 2022-07-26

## 2024-02-14 DIAGNOSIS — N39.41 URGE INCONTINENCE OF URINE: Primary | ICD-10-CM

## 2024-03-18 ENCOUNTER — OFFICE VISIT (OUTPATIENT)
Dept: UROLOGY | Facility: CLINIC | Age: 54
End: 2024-03-18
Payer: MEDICARE

## 2024-03-18 VITALS
BODY MASS INDEX: 47.84 KG/M2 | OXYGEN SATURATION: 96 % | HEIGHT: 63 IN | WEIGHT: 270 LBS | RESPIRATION RATE: 18 BRPM | DIASTOLIC BLOOD PRESSURE: 80 MMHG | SYSTOLIC BLOOD PRESSURE: 134 MMHG | HEART RATE: 53 BPM

## 2024-03-18 DIAGNOSIS — N39.41 URGE INCONTINENCE OF URINE: ICD-10-CM

## 2024-03-18 DIAGNOSIS — R33.9 INCOMPLETE BLADDER EMPTYING: Primary | ICD-10-CM

## 2024-03-18 LAB
BILIRUB SERPL-MCNC: NEGATIVE MG/DL
BLOOD URINE, POC: NEGATIVE
COLOR, POC UA: YELLOW
GLUCOSE UR QL STRIP: NEGATIVE
KETONES UR QL STRIP: NEGATIVE
LEUKOCYTE ESTERASE URINE, POC: NEGATIVE
NITRITE, POC UA: NEGATIVE
PH, POC UA: 7
POC RESIDUAL URINE VOLUME: 127 ML (ref 0–100)
PROTEIN, POC: NEGATIVE
SPECIFIC GRAVITY, POC UA: 1.02
UROBILINOGEN, POC UA: 1

## 2024-03-18 PROCEDURE — 3079F DIAST BP 80-89 MM HG: CPT | Mod: CPTII,,, | Performed by: UROLOGY

## 2024-03-18 PROCEDURE — 81001 URINALYSIS AUTO W/SCOPE: CPT | Mod: PBBFAC | Performed by: UROLOGY

## 2024-03-18 PROCEDURE — 1160F RVW MEDS BY RX/DR IN RCRD: CPT | Mod: CPTII,,, | Performed by: UROLOGY

## 2024-03-18 PROCEDURE — 3075F SYST BP GE 130 - 139MM HG: CPT | Mod: CPTII,,, | Performed by: UROLOGY

## 2024-03-18 PROCEDURE — 51798 US URINE CAPACITY MEASURE: CPT | Mod: PBBFAC | Performed by: UROLOGY

## 2024-03-18 PROCEDURE — 99214 OFFICE O/P EST MOD 30 MIN: CPT | Mod: PBBFAC | Performed by: UROLOGY

## 2024-03-18 PROCEDURE — 1159F MED LIST DOCD IN RCRD: CPT | Mod: CPTII,,, | Performed by: UROLOGY

## 2024-03-18 PROCEDURE — 3008F BODY MASS INDEX DOCD: CPT | Mod: CPTII,,, | Performed by: UROLOGY

## 2024-03-18 PROCEDURE — 99204 OFFICE O/P NEW MOD 45 MIN: CPT | Mod: S$PBB,,, | Performed by: UROLOGY

## 2024-03-18 RX ORDER — NAPROXEN SODIUM 220 MG/1
TABLET, FILM COATED ORAL
COMMUNITY
Start: 2024-02-22

## 2024-03-18 RX ORDER — FERROUS SULFATE 325(65) MG
325 TABLET, DELAYED RELEASE (ENTERIC COATED) ORAL 2 TIMES DAILY
COMMUNITY

## 2024-03-18 RX ORDER — TAMSULOSIN HYDROCHLORIDE 0.4 MG/1
0.4 CAPSULE ORAL DAILY
Qty: 90 CAPSULE | Refills: 3 | Status: SHIPPED | OUTPATIENT
Start: 2024-03-18 | End: 2025-03-18

## 2024-03-18 NOTE — PROGRESS NOTES
CC:  Incontinence    HPI:  Greta Castillo is a 53 y.o. female seen in consultation for incontinence.  She is a former patient of mine last seen prior to 2020.  She had a TVT in 2011 and then had some problems with pulling sensation so a urethrolysis was done around 2014.  She has nocturia four to five times voiding small amounts.  She has diurnal frequency as well.  She doesn't feel like the bladder is emptying.  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      Bladder scan residual:  127 ml    Urinalysis:  Results for orders placed or performed in visit on 03/18/24   POCT URINE DIPSTICK WITH MICROSCOPE, AUTOMATED   Result Value Ref Range    Color, UA Yellow     Spec Grav UA 1.025     pH, UA 7.0     WBC, UA Negative     Nitrite, UA Negative     Protein, POC Negative     Glucose, UA Negative     Ketones, UA Negative     Urobilinogen, UA 1.0     Bilirubin, POC Negative     Blood, UA Negative      Microscopic Urinalysis:  WBC:   None per HPF     RBC:    None per HPF    Bacteria:    None per HPF     Squamous epithelial cells:    None per HPF       Crystals:   None     Data Review:  Note from referring provider, Prasad Rendon MD dated 7 March 2024.     ROS:  All systems reviewed and are negative except as documented in HPI and/or Assessment and Plan.     Patient Active Problem List:     Patient Active Problem List   Diagnosis    Sjogren's syndrome    Persistent insomnia    Positive antinuclear antibody    Multiple sclerosis    Spasticity    Rash    Peripheral nerve disease    Cerebrovascular accident    Migraine    Lumbar radiculopathy    Internal derangement of left knee    Gastroesophageal reflux disease    Foot pain    Disorder of hip region    Chronic pain syndrome    Bilateral carpal tunnel syndrome        Past Medical History:  Past Medical History:   Diagnosis Date    Acid reflux     Anemia     Anxiety     Arthritis     Asthma     Depression     Hypertension     Stroke         Past  Surgical History:  Past Surgical History:   Procedure Laterality Date    CARPAL TUNNEL RELEASE Left      SECTION      CYSTOCELE REPAIR      HYSTERECTOMY      KNEE SURGERY      TUBAL LIGATION          Family History:  Family History   Problem Relation Age of Onset    Cancer Mother     Diabetes Mellitus Mother     Hypertension Mother     Cancer Father         Social History:  Social History     Socioeconomic History    Marital status:    Tobacco Use    Smoking status: Never    Smokeless tobacco: Never   Substance and Sexual Activity    Alcohol use: No    Drug use: Yes     Types: Hydrocodone        Allergies:  Review of patient's allergies indicates:  No Known Allergies     Objective:  Vitals:    24 0932   BP: 134/80   Pulse: (!) 53   Resp: 18     General:  Well developed, well nourished adult female in no acute distress  Abdomen: Soft, nontender, no masses  Extremities:  No clubbing, cyanosis, or edema  Neurologic:  Grossly intact  Musculoskeletal:  Normal tone    Assessment:  1. Urge incontinence of urine  - Ambulatory referral/consult to Urology  - POCT URINE DIPSTICK WITH MICROSCOPE, AUTOMATED  - POCT Bladder Scan    2. Incomplete bladder emptying  - tamsulosin (FLOMAX) 0.4 mg Cap; Take 1 capsule (0.4 mg total) by mouth once daily.  Dispense: 90 capsule; Refill: 3     Plan:  Observation of the incontinence for now.  We will work on the incomplete emptying and proceed from there.  Given tamsulosin for incomplete bladder emptying.  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    Follow-up:    One month with bladder scan.

## 2024-03-18 NOTE — PROGRESS NOTES
Patient seen by Dr. SHAHEEN Deras. Will return in one month with a bladder scan. Written and verbal discharge instructions given.

## 2024-04-18 ENCOUNTER — OFFICE VISIT (OUTPATIENT)
Dept: UROLOGY | Facility: CLINIC | Age: 54
End: 2024-04-18
Payer: MEDICARE

## 2024-04-18 VITALS
TEMPERATURE: 98 F | WEIGHT: 267.38 LBS | HEART RATE: 73 BPM | BODY MASS INDEX: 47.38 KG/M2 | HEIGHT: 63 IN | RESPIRATION RATE: 20 BRPM | SYSTOLIC BLOOD PRESSURE: 129 MMHG | OXYGEN SATURATION: 100 % | DIASTOLIC BLOOD PRESSURE: 92 MMHG

## 2024-04-18 DIAGNOSIS — R33.9 INCOMPLETE BLADDER EMPTYING: Primary | ICD-10-CM

## 2024-04-18 DIAGNOSIS — N39.41 URGE INCONTINENCE OF URINE: ICD-10-CM

## 2024-04-18 LAB
BILIRUB SERPL-MCNC: NEGATIVE MG/DL
BLOOD URINE, POC: NEGATIVE
COLOR, POC UA: YELLOW
GLUCOSE UR QL STRIP: NEGATIVE
KETONES UR QL STRIP: NEGATIVE
LEUKOCYTE ESTERASE URINE, POC: NEGATIVE
NITRITE, POC UA: NEGATIVE
PH, POC UA: 7
POC RESIDUAL URINE VOLUME: 63 ML (ref 0–100)
PROTEIN, POC: NEGATIVE
SPECIFIC GRAVITY, POC UA: 1.02
UROBILINOGEN, POC UA: 0.2

## 2024-04-18 PROCEDURE — 99214 OFFICE O/P EST MOD 30 MIN: CPT | Mod: PBBFAC | Performed by: UROLOGY

## 2024-04-18 PROCEDURE — 1160F RVW MEDS BY RX/DR IN RCRD: CPT | Mod: CPTII,,, | Performed by: UROLOGY

## 2024-04-18 PROCEDURE — 3008F BODY MASS INDEX DOCD: CPT | Mod: CPTII,,, | Performed by: UROLOGY

## 2024-04-18 PROCEDURE — 81001 URINALYSIS AUTO W/SCOPE: CPT | Mod: PBBFAC | Performed by: UROLOGY

## 2024-04-18 PROCEDURE — 3074F SYST BP LT 130 MM HG: CPT | Mod: CPTII,,, | Performed by: UROLOGY

## 2024-04-18 PROCEDURE — 51798 US URINE CAPACITY MEASURE: CPT | Mod: PBBFAC | Performed by: UROLOGY

## 2024-04-18 PROCEDURE — 1159F MED LIST DOCD IN RCRD: CPT | Mod: CPTII,,, | Performed by: UROLOGY

## 2024-04-18 PROCEDURE — 99214 OFFICE O/P EST MOD 30 MIN: CPT | Mod: S$PBB,,, | Performed by: UROLOGY

## 2024-04-18 PROCEDURE — 3080F DIAST BP >= 90 MM HG: CPT | Mod: CPTII,,, | Performed by: UROLOGY

## 2024-04-18 RX ORDER — MIRABEGRON 50 MG/1
50 TABLET, EXTENDED RELEASE ORAL DAILY
Qty: 90 TABLET | Refills: 3 | Status: SHIPPED | OUTPATIENT
Start: 2024-04-18 | End: 2025-04-18

## 2024-04-18 NOTE — PROGRESS NOTES
CC:  Follow-up    HPI:  Greta Castillo is a 53 y.o. female here for follow-up.  She is a former patient of mine last seen prior to 2020.  She had a TVT in 2011 and then had some problems with pulling sensation so a urethrolysis was done around 2014.  She was having nocturia four to five times voiding small amounts as well as diurnal frequency and not feeling like the bladder is emptying.  She has multiple sclerosis and recently had a bout of Guillain [place].  At the last visit I placed her on tamsulosin.  She took this for a while but has been off of it for about two weeks.  She was taking some medication for an upper respiratory infection and she felt sick while she was taking both of those medications so she stopped the tamsulosin.  She is now having urgency and urge incontinence in addition to the urinary frequency.    Bladder scan residual:  63 ml    Urinalysis:    Results for orders placed or performed in visit on 04/18/24   POCT URINE DIPSTICK WITH MICROSCOPE, AUTOMATED   Result Value Ref Range    Color, UA Yellow     Spec Grav UA 1.020     pH, UA 7.0     WBC, UA Negative     Nitrite, UA Negative     Protein, POC Negative     Glucose, UA Negative     Ketones, UA Negative     Urobilinogen, UA 0.2     Bilirubin, POC Negative     Blood, UA Negative      Microscopic Urinalysis:  WBC:    None per HPF     RBC:    None per HPF     Bacteria:    None per HPF     Squamous epithelial cells:    None per HPF      Crystals:   None    ROS:  All systems reviewed and are negative except as documented in HPI and/or Assessment and Plan.     Patient Active Problem List:     Patient Active Problem List   Diagnosis    Sjogren's syndrome    Persistent insomnia    Positive antinuclear antibody    Multiple sclerosis    Spasticity    Rash    Peripheral nerve disease    Cerebrovascular accident    Migraine    Lumbar radiculopathy    Internal derangement of left knee    Gastroesophageal reflux disease    Foot pain    Disorder of hip  region    Chronic pain syndrome    Bilateral carpal tunnel syndrome        Past Medical History:  Past Medical History:   Diagnosis Date    Acid reflux     Anemia     Anxiety     Arthritis     Asthma     Depression     Hypertension     Stroke         Past Surgical History:  Past Surgical History:   Procedure Laterality Date    CARPAL TUNNEL RELEASE Left      SECTION      CYSTOCELE REPAIR      HYSTERECTOMY      KNEE SURGERY      TUBAL LIGATION          Family History:  Family History   Problem Relation Name Age of Onset    Cancer Mother      Diabetes Mellitus Mother      Hypertension Mother      Cancer Father          Social History:  Social History     Socioeconomic History    Marital status:    Tobacco Use    Smoking status: Never     Passive exposure: Never    Smokeless tobacco: Never   Substance and Sexual Activity    Alcohol use: No    Drug use: Yes     Types: Hydrocodone     Social Determinants of Health     Food Insecurity: No Food Insecurity (2023)    Received from [facility]    Hunger Vital Sign     Worried About Running Out of Food in the Last Year: Never true     Ran Out of Food in the Last Year: Never true   Transportation Needs: Not At Risk (2023)    Received from [facility]    Transportation Needs     In the past 12 months, has lack of reliable transportation kept you from medical appointments, meetings, work or from getting things needed for daily living?: No        Allergies:  Review of patient's allergies indicates:  No Known Allergies     Objective:  Vitals:    24 1521   BP: (!) 129/92   Pulse: 73   Resp: 20   Temp: 98.1 °F (36.7 °C)     General:  Well developed, well nourished adult female in no acute distress  Abdomen: Soft, nontender, no masses  Extremities:  No clubbing, cyanosis, or edema  Neurologic:  Grossly intact  Musculoskeletal:  Normal tone    Assessment:  1. Incomplete bladder emptying  - POCT URINE  DIPSTICK WITH MICROSCOPE, AUTOMATED  - POCT Bladder Scan    2. Urge incontinence of urine  - mirabegron (MYRBETRIQ) 50 mg Tb24; Take 1 tablet (50 mg total) by mouth once daily.  Dispense: 90 tablet; Refill: 3     Plan:  She is emptying the bladder well at this time.  We will schedule her for a cystoscopy.  With the urge incontinence and the urinary frequency I have placed her on Myrbetriq to see if this is due to overactive bladder.    Follow-up:    Six weeks for cystoscopy.

## 2024-06-13 ENCOUNTER — PROCEDURE VISIT (OUTPATIENT)
Dept: UROLOGY | Facility: CLINIC | Age: 54
End: 2024-06-13
Payer: MEDICARE

## 2024-06-13 VITALS
OXYGEN SATURATION: 99 % | BODY MASS INDEX: 49.39 KG/M2 | DIASTOLIC BLOOD PRESSURE: 91 MMHG | HEART RATE: 73 BPM | HEIGHT: 62 IN | TEMPERATURE: 98 F | SYSTOLIC BLOOD PRESSURE: 136 MMHG | WEIGHT: 268.38 LBS | RESPIRATION RATE: 20 BRPM

## 2024-06-13 DIAGNOSIS — R33.9 INCOMPLETE BLADDER EMPTYING: Primary | ICD-10-CM

## 2024-06-13 DIAGNOSIS — N39.41 URGE INCONTINENCE OF URINE: ICD-10-CM

## 2024-06-13 LAB
BILIRUB SERPL-MCNC: NORMAL MG/DL
BLOOD URINE, POC: NORMAL
COLOR, POC UA: YELLOW
GLUCOSE UR QL STRIP: NORMAL
KETONES UR QL STRIP: NORMAL
LEUKOCYTE ESTERASE URINE, POC: NORMAL
NITRITE, POC UA: NORMAL
PH, POC UA: 6
PROTEIN, POC: NORMAL
SPECIFIC GRAVITY, POC UA: 1.02
UROBILINOGEN, POC UA: 0.2

## 2024-06-13 PROCEDURE — 52000 CYSTOURETHROSCOPY: CPT | Mod: PBBFAC | Performed by: UROLOGY

## 2024-06-13 PROCEDURE — 52000 CYSTOURETHROSCOPY: CPT | Mod: S$PBB,,, | Performed by: UROLOGY

## 2024-06-13 PROCEDURE — 99212 OFFICE O/P EST SF 10 MIN: CPT | Mod: 25,S$PBB,, | Performed by: UROLOGY

## 2024-06-13 PROCEDURE — 81001 URINALYSIS AUTO W/SCOPE: CPT | Mod: PBBFAC | Performed by: UROLOGY

## 2024-06-13 RX ORDER — CIPROFLOXACIN 500 MG/1
500 TABLET ORAL
Status: COMPLETED | OUTPATIENT
Start: 2024-06-13 | End: 2024-06-13

## 2024-06-13 RX ORDER — SULFAMETHOXAZOLE AND TRIMETHOPRIM 800; 160 MG/1; MG/1
1 TABLET ORAL 2 TIMES DAILY
COMMUNITY

## 2024-06-13 RX ORDER — LIDOCAINE HYDROCHLORIDE 20 MG/ML
JELLY TOPICAL
Status: COMPLETED | OUTPATIENT
Start: 2024-06-13 | End: 2024-06-13

## 2024-06-13 RX ADMIN — CIPROFLOXACIN 500 MG: 500 TABLET ORAL at 11:06

## 2024-06-13 RX ADMIN — LIDOCAINE HYDROCHLORIDE: 20 JELLY TOPICAL at 11:06

## 2024-06-13 NOTE — PROGRESS NOTES
Pt seen by Dr. Schafer. Cysto performed in clinic. Consents signed and obtained by staff. Pt received medication per procedure protocol. Ciprofloxacin HCl tablet 500 mg & LIDOcaine HCl 2% urojet administered and tolerated well. RTC 1 MNTH . Pt education given both written and verbal.

## 2024-06-16 NOTE — PROCEDURES
CC:  Cystoscopy    HPI:  Greta Castillo is a 53 y.o. female here for cystoscopy for incomplete bladder emptying.  She is a former patient of mine last seen prior to 2020.  She had a TVT in 2011 and then had some problems with pulling sensation so a urethrolysis was done around 2014.  She was having nocturia four to five times voiding small amounts as well as diurnal frequency and not feeling like the bladder is emptying.  She has multiple sclerosis and recently had a bout of Guillain [place].  She was tried on tamsulosin but stopped taking it because she was on another medication and she was feeling sick so he stopped the tamsulosin.  She did not feel like it was doing much good while she was on it..  She is now having urgency and urge incontinence in addition to the urinary frequency.  She has been on Myrbetriq but this has not made a difference in the urinary frequency.    Urinalysis:  Results for orders placed or performed in visit on 06/13/24   POCT URINE DIPSTICK WITH MICROSCOPE, AUTOMATED   Result Value Ref Range    Color, UA Yellow     Spec Grav UA 1.025     pH, UA 6.0     WBC, UA neg     Nitrite, UA neg     Protein, POC neg     Glucose, UA neg     Ketones, UA neg     Urobilinogen, UA 0.2     Bilirubin, POC neg     Blood, UA neg        Microscopic Urinalysis:  WBC:   None per HPF     RBC:    None per HPF     Bacteria:    None per HPF     Squamous epithelial cells:    None per HPF  Crystals:   None     ROS:  All systems reviewed and are negative except as documented in HPI and/or Assessment and Plan.     Patient Active Problem List:     Patient Active Problem List   Diagnosis    Sjogren's syndrome    Persistent insomnia    Positive antinuclear antibody    Multiple sclerosis    Spasticity    Rash    Peripheral nerve disease    Cerebrovascular accident    Migraine    Lumbar radiculopathy    Internal derangement of left knee    Gastroesophageal reflux disease    Foot pain    Disorder of hip region    Chronic  pain syndrome    Bilateral carpal tunnel syndrome        Past Medical History:  Past Medical History:   Diagnosis Date    Acid reflux     Anemia     Anxiety     Arthritis     Asthma     Depression     Hypertension     Stroke         Past Surgical History:  Past Surgical History:   Procedure Laterality Date    CARPAL TUNNEL RELEASE Left      SECTION      CYSTOCELE REPAIR      HYSTERECTOMY      KNEE SURGERY      TUBAL LIGATION          Family History:  Family History   Problem Relation Name Age of Onset    Cancer Mother      Diabetes Mellitus Mother      Hypertension Mother      Cancer Father          Social History:  Social History     Socioeconomic History    Marital status:    Tobacco Use    Smoking status: Never     Passive exposure: Never    Smokeless tobacco: Never   Substance and Sexual Activity    Alcohol use: No    Drug use: Yes     Types: Hydrocodone     Social Determinants of Health     Food Insecurity: No Food Insecurity (2023)    Received from [facility]    Hunger Vital Sign     Worried About Running Out of Food in the Last Year: Never true     Ran Out of Food in the Last Year: Never true   Transportation Needs: Not At Risk (2023)    Received from [facility]    Transportation Needs     In the past 12 months, has lack of reliable transportation kept you from medical appointments, meetings, work or from getting things needed for daily living?: No        Allergies:  Review of patient's allergies indicates:  No Known Allergies     Objective:  Vitals:    24 1121   BP: (!) 136/91   Pulse: 73   Resp: 20   Temp: 98.1 °F (36.7 °C)     General:  Well developed, well nourished adult female in no acute distress  Abdomen: Soft, nontender, no masses  Extremities:  No clubbing, cyanosis, or edema  Neurologic:  Grossly intact  Musculoskeletal:  Normal tone  :  External genitalia is normal without lesions.  Vagina is normal.       Cystoscopy:         - Urethral meatus:  No strictures        - Urethra:  Normal without strictures or lesions        - Bladder neck:  Normal        - Bladder:  No mucosal abnormalities        - Ureteral orifices:  On the trigone with clear efflux bilaterally    The patient tolerated the procedure well without complications.  She was given Cipro 500mg, one tablet in the clinic.   The urethra was anesthetized with 2% Lidocaine Jelly, Urojet.      Assessment:  1. Incomplete bladder emptying    2. Urge incontinence of urine  - POCT URINE DIPSTICK WITH MICROSCOPE, AUTOMATED     Plan:  The emptying did improve somewhat at her last visit.  I do not see any evidence of obstruction today.  I am going to have her keep a voiding diary for the complaint of urinary frequency and urge incontinence.  She did not improve on Myrbetriq.  Need to rule out SIADH.  Botox is a consideration.    Follow-up:    One month with voiding diary.

## 2024-07-10 ENCOUNTER — OFFICE VISIT (OUTPATIENT)
Dept: UROLOGY | Facility: CLINIC | Age: 54
End: 2024-07-10
Payer: MEDICARE

## 2024-07-10 VITALS
OXYGEN SATURATION: 98 % | TEMPERATURE: 98 F | BODY MASS INDEX: 49.9 KG/M2 | RESPIRATION RATE: 18 BRPM | DIASTOLIC BLOOD PRESSURE: 78 MMHG | HEIGHT: 62 IN | WEIGHT: 271.19 LBS | HEART RATE: 74 BPM | SYSTOLIC BLOOD PRESSURE: 116 MMHG

## 2024-07-10 DIAGNOSIS — R35.0 URINARY FREQUENCY: Primary | ICD-10-CM

## 2024-07-10 DIAGNOSIS — N39.41 URGE INCONTINENCE: ICD-10-CM

## 2024-07-10 LAB
BILIRUB SERPL-MCNC: NEGATIVE MG/DL
BLOOD URINE, POC: NEGATIVE
COLOR, POC UA: YELLOW
GLUCOSE UR QL STRIP: NEGATIVE
KETONES UR QL STRIP: NEGATIVE
LEUKOCYTE ESTERASE URINE, POC: NEGATIVE
NITRITE, POC UA: NEGATIVE
PH, POC UA: 6
PROTEIN, POC: NEGATIVE
SPECIFIC GRAVITY, POC UA: 1.02
UROBILINOGEN, POC UA: 0.2

## 2024-07-10 PROCEDURE — 99215 OFFICE O/P EST HI 40 MIN: CPT | Mod: PBBFAC | Performed by: UROLOGY

## 2024-07-10 PROCEDURE — 1160F RVW MEDS BY RX/DR IN RCRD: CPT | Mod: CPTII,,, | Performed by: UROLOGY

## 2024-07-10 PROCEDURE — 3074F SYST BP LT 130 MM HG: CPT | Mod: CPTII,,, | Performed by: UROLOGY

## 2024-07-10 PROCEDURE — 1159F MED LIST DOCD IN RCRD: CPT | Mod: CPTII,,, | Performed by: UROLOGY

## 2024-07-10 PROCEDURE — 3008F BODY MASS INDEX DOCD: CPT | Mod: CPTII,,, | Performed by: UROLOGY

## 2024-07-10 PROCEDURE — 99214 OFFICE O/P EST MOD 30 MIN: CPT | Mod: S$PBB,,, | Performed by: UROLOGY

## 2024-07-10 PROCEDURE — 3078F DIAST BP <80 MM HG: CPT | Mod: CPTII,,, | Performed by: UROLOGY

## 2024-07-10 PROCEDURE — 81001 URINALYSIS AUTO W/SCOPE: CPT | Mod: PBBFAC | Performed by: UROLOGY

## 2024-07-10 NOTE — PROGRESS NOTES
CC:    Urinary frequency    HPI:  Greta Castillo is a 53 y.o. female here for follow-up of urinary frequency.  She is a former patient of mine last seen prior to 2020.  She had a TVT in 2011 and then had some problems with pulling sensation so a urethrolysis was done around 2014.  She was having nocturia four to five times voiding small amounts as well as diurnal frequency and not feeling like the bladder is emptying.  She has multiple sclerosis and recently had a bout of Guillain [place].  She was initially tried tamsulosin but she stopped taking that due to so side effects.  Her recent bladder scans have.  She urinary frequency and is also having urgency and urge incontinence.  She was on Myrbetriq but did not really notice it it was helping so she stopped taking it and did not notice any change.  She says that she may have to go to the bathroom every hour both day and night.  She brings a voiding diary today which shows 1.2 L in and 1.6 L out.  Volumes are a high 200 cc and a low of 100 cc for a total of 11 voids.    Bladder scan residual:  41 ml    Urinalysis:    Results for orders placed or performed in visit on 07/10/24   POCT URINE DIPSTICK WITH MICROSCOPE, AUTOMATED   Result Value Ref Range    Color, UA Yellow     Spec Grav UA 1.025     pH, UA 6.0     WBC, UA Negative     Nitrite, UA Negative     Protein, POC Negative     Glucose, UA Negative     Ketones, UA Negative     Urobilinogen, UA 0.2     Bilirubin, POC Negative     Blood, UA Negative      Microscopic Urinalysis:  WBC:   None per HPF     RBC:    None per HPF     Bacteria:    None per HPF     Squamous epithelial cells:    None per HPF      Crystals:   None    ROS:  All systems reviewed and are negative except as documented in HPI and/or Assessment and Plan.     Patient Active Problem List:     Patient Active Problem List   Diagnosis    Sjogren's syndrome    Persistent insomnia    Positive antinuclear antibody    Multiple sclerosis    Spasticity     Rash    Peripheral nerve disease    Cerebrovascular accident    Migraine    Lumbar radiculopathy    Internal derangement of left knee    Gastroesophageal reflux disease    Foot pain    Disorder of hip region    Chronic pain syndrome    Bilateral carpal tunnel syndrome        Past Medical History:  Past Medical History:   Diagnosis Date    Acid reflux     Anemia     Anxiety     Arthritis     Asthma     Depression     Hypertension     Stroke         Past Surgical History:  Past Surgical History:   Procedure Laterality Date    CARPAL TUNNEL RELEASE Left      SECTION      CYSTOCELE REPAIR      HYSTERECTOMY      KNEE SURGERY      TUBAL LIGATION          Family History:  Family History   Problem Relation Name Age of Onset    Cancer Mother      Diabetes Mellitus Mother      Hypertension Mother      Cancer Father          Social History:  Social History     Socioeconomic History    Marital status:    Tobacco Use    Smoking status: Never     Passive exposure: Never    Smokeless tobacco: Never   Substance and Sexual Activity    Alcohol use: No    Drug use: Yes     Types: Hydrocodone     Social Determinants of Health     Food Insecurity: No Food Insecurity (2023)    Received from [facility]    Hunger Vital Sign     Worried About Running Out of Food in the Last Year: Never true     Ran Out of Food in the Last Year: Never true   Transportation Needs: Not At Risk (2023)    Received from [facility]    Transportation Needs     In the past 12 months, has lack of reliable transportation kept you from medical appointments, meetings, work or from getting things needed for daily living?: No        Allergies:  Review of patient's allergies indicates:  No Known Allergies     Objective:  Vitals:    07/10/24 0941   BP: 116/78   Pulse: 74   Resp: 18   Temp: 97.9 °F (36.6 °C)     General:  Well developed, well nourished adult female in no acute distress  Abdomen: Soft,  nontender, no masses  Extremities:  No clubbing, cyanosis, or edema  Neurologic:  Grossly intact  Musculoskeletal:  Normal tone    Assessment:  1. Urinary frequency  - POCT URINE DIPSTICK WITH MICROSCOPE, AUTOMATED    2. Urge incontinence     Plan:  I discussed the option trying Myrbetriq plus oxybutynin however she is on several medications already and does not want to take more.  She also already has a problem with constipation.  I then discussed the option of Botox.  She wishes to proceed that.    Follow-up:    Cystoscopy with Botox.    I spent a total of 32 minutes on the day of the visit.This includes face to face time and non-face to face time preparing to see the patient (eg, review of tests), obtaining and/or reviewing separately obtained history, documenting clinical information in the electronic or other health record, independently interpreting results and communicating results to the patient/family/caregiver, or care coordinator.

## 2024-08-26 ENCOUNTER — PROCEDURE VISIT (OUTPATIENT)
Dept: UROLOGY | Facility: CLINIC | Age: 54
End: 2024-08-26
Payer: MEDICARE

## 2024-08-26 VITALS
TEMPERATURE: 98 F | BODY MASS INDEX: 46.72 KG/M2 | RESPIRATION RATE: 20 BRPM | DIASTOLIC BLOOD PRESSURE: 84 MMHG | SYSTOLIC BLOOD PRESSURE: 171 MMHG | HEIGHT: 64 IN | OXYGEN SATURATION: 100 % | HEART RATE: 66 BPM | WEIGHT: 273.63 LBS

## 2024-08-26 DIAGNOSIS — N39.41 URGE INCONTINENCE OF URINE: Primary | ICD-10-CM

## 2024-08-26 LAB
BILIRUB SERPL-MCNC: NORMAL MG/DL
BLOOD URINE, POC: NORMAL
COLOR, POC UA: YELLOW
GLUCOSE UR QL STRIP: NORMAL
KETONES UR QL STRIP: NORMAL
LEUKOCYTE ESTERASE URINE, POC: NORMAL
NITRITE, POC UA: NORMAL
PH, POC UA: 6.5
PROTEIN, POC: NORMAL
SPECIFIC GRAVITY, POC UA: 1.02
UROBILINOGEN, POC UA: 0.2

## 2024-08-26 PROCEDURE — 81001 URINALYSIS AUTO W/SCOPE: CPT | Mod: PBBFAC | Performed by: UROLOGY

## 2024-08-26 PROCEDURE — 52287 CYSTOSCOPY CHEMODENERVATION: CPT | Mod: PBBFAC | Performed by: UROLOGY

## 2024-08-26 PROCEDURE — 52287 CYSTOSCOPY CHEMODENERVATION: CPT | Mod: S$PBB,,, | Performed by: UROLOGY

## 2024-08-26 RX ORDER — LIDOCAINE HYDROCHLORIDE 20 MG/ML
40 INJECTION, SOLUTION EPIDURAL; INFILTRATION; INTRACAUDAL; PERINEURAL
Status: COMPLETED | OUTPATIENT
Start: 2024-08-26 | End: 2024-08-26

## 2024-08-26 RX ORDER — LIDOCAINE HYDROCHLORIDE 20 MG/ML
JELLY TOPICAL
Status: COMPLETED | OUTPATIENT
Start: 2024-08-26 | End: 2024-08-26

## 2024-08-26 RX ORDER — CIPROFLOXACIN 500 MG/1
500 TABLET ORAL
Status: COMPLETED | OUTPATIENT
Start: 2024-08-26 | End: 2024-08-26

## 2024-08-26 RX ADMIN — LIDOCAINE HYDROCHLORIDE 800 MG: 20 INJECTION, SOLUTION EPIDURAL; INFILTRATION; INTRACAUDAL; PERINEURAL at 09:08

## 2024-08-26 RX ADMIN — LIDOCAINE HYDROCHLORIDE: 20 JELLY TOPICAL at 09:08

## 2024-08-26 RX ADMIN — ONABOTULINUMTOXINA 100 UNITS: 100 INJECTION, POWDER, LYOPHILIZED, FOR SOLUTION INTRADERMAL; INTRAMUSCULAR at 09:08

## 2024-08-26 RX ADMIN — CIPROFLOXACIN 500 MG: 500 TABLET ORAL at 09:08

## 2024-08-26 NOTE — PROCEDURES
CC:  Cystoscopy    HPI:  Greta Castillo is a 53 y.o. female here for cystoscopy for  Botox injection.  She urinary frequency and is also having urgency and urge incontinence.  She was on Myrbetriq but did not really notice it it was helping so she stopped taking it and did not notice any change.  She says that she may have to go to the bathroom every hour both day and night.  She brings a voiding diary today which shows 1.2 L in and 1.6 L out.  Volumes at the most are 200 cc and a low of 100 cc for a total of 11 voids.  She is a former patient of mine last seen prior to 2020. She had a TVT in 2011 and then had some problems with pulling sensation so a urethrolysis was done around 2014. She was having nocturia four to five times voiding small amounts as well as diurnal frequency and not feeling like the bladder is emptying. She has multiple sclerosis and recently had a bout of Guillain [place]. She was initially tried tamsulosin but she stopped taking that due to so side effects. Her recent bladder scans have.     Urinalysis:  Results for orders placed or performed in visit on 08/26/24   POCT URINE DIPSTICK WITH MICROSCOPE, AUTOMATED   Result Value Ref Range    Color, UA Yellow     Spec Grav UA 1.025     pH, UA 6.5     WBC, UA neg     Nitrite, UA neg     Protein, POC neg     Glucose, UA neg     Ketones, UA neg     Urobilinogen, UA 0.2     Bilirubin, POC neg     Blood, UA neg       Microscopic Urinalysis:  WBC:   None per HPF     RBC:    None per HPF     Bacteria:    None per HPF     Squamous epithelial cells:  None per HPF      Crystals:   None    ROS:  All systems reviewed and are negative except as documented in HPI and/or Assessment and Plan.     Patient Active Problem List:     Patient Active Problem List   Diagnosis    Sjogren's syndrome    Persistent insomnia    Positive antinuclear antibody    Multiple sclerosis    Spasticity    Rash    Peripheral nerve disease    Cerebrovascular accident    Migraine     Lumbar radiculopathy    Internal derangement of left knee    Gastroesophageal reflux disease    Foot pain    Disorder of hip region    Chronic pain syndrome    Bilateral carpal tunnel syndrome        Past Medical History:  Past Medical History:   Diagnosis Date    Acid reflux     Anemia     Anxiety     Arthritis     Asthma     Depression     Hypertension     Stroke         Past Surgical History:  Past Surgical History:   Procedure Laterality Date    CARPAL TUNNEL RELEASE Left      SECTION      CYSTOCELE REPAIR      HYSTERECTOMY      KNEE SURGERY      TUBAL LIGATION          Family History:  Family History   Problem Relation Name Age of Onset    Cancer Mother      Diabetes Mellitus Mother      Hypertension Mother      Cancer Father          Social History:  Social History     Socioeconomic History    Marital status:    Tobacco Use    Smoking status: Never     Passive exposure: Never    Smokeless tobacco: Never   Substance and Sexual Activity    Alcohol use: No    Drug use: Not Currently     Social Determinants of Health     Food Insecurity: No Food Insecurity (2023)    Received from [facility]    Hunger Vital Sign     Worried About Running Out of Food in the Last Year: Never true     Ran Out of Food in the Last Year: Never true   Transportation Needs: Not At Risk (2023)    Received from [facility]    Transportation Needs     In the past 12 months, has lack of reliable transportation kept you from medical appointments, meetings, work or from getting things needed for daily living?: No        Allergies:  Review of patient's allergies indicates:  No Known Allergies     Objective:  Vitals:    24 0847   BP: (!) 171/84   Pulse:    Resp:    Temp:      General:  Well developed, well nourished adult female in no acute distress  Abdomen: Soft, nontender, no masses  Extremities:  No clubbing, cyanosis, or edema  Neurologic:  Grossly  intact  Musculoskeletal:  Normal tone  :  External genitalia is normal without lesions.  Vagina is normal.      The bladder was anesthetized with 40 mL of 2% lidocaine.  This was allowed to remain in the bladder for 20 minutes prior to beginning the procedure.     Cystoscopy:         - Urethral meatus:  No strictures        - Urethra:  Normal without strictures or lesions        - Bladder neck:  Normal        - Bladder:  No mucosal abnormalities        - Ureteral orifices:  On the trigone with clear efflux bilaterally    Procedure:  100 units of onabotulinum toxin A was reconstituted using preservative free normal saline.  I used a 3mm Laborie needle to inject 20 separate 0.5 cc alliquots into the detrusor muscle sparing the trigone and dome.     The patient tolerated the procedure well without complications.  She was given Cipro 500mg, one tablet in the clinic.   The urethra was anesthetized with 2% Lidocaine Jelly, Urojet and the bladder was instilled with 40 mL of 2% lidocaine which was allowed to remain in the bladder for 15 minutes prior to beginning the procedure.      Assessment:  1. Urge incontinence of urine  - POCT URINE DIPSTICK WITH MICROSCOPE, AUTOMATED  - LIDOcaine (PF) 20 mg/mL (2%) injection 800 mg  - onabotulinumtoxina injection 100 Units     Plan:  Patient was cautioned to watch for any difficulty urinating and to return immediately if she is having problems.  Return if she has any signs or symptoms of an infection.  She knows to expect a little blood in the urine potentially.    Follow-up:    One month for bladder scan.

## 2024-08-26 NOTE — PROGRESS NOTES
Pt seen by Dr. Deras. Pt consented & premedicated for procedure. Time out documentation completed. Lidocaine 40ml/800mg instilled into bladder via 15F in & out catheter using aseptic technique w/moderate amount of clear yellow urine obtained. Urojet applied. OnabotulinumtoxinA reconstituted w/10 ml 0.9 Sodium Chloride injection. Cystoscopy performed w/no complications. Botox administered. Pt instructed to return to clinic in 1 month. Discharge paperwork given w/pt verbalizing understanding.

## 2025-01-02 ENCOUNTER — OFFICE VISIT (OUTPATIENT)
Dept: UROLOGY | Facility: CLINIC | Age: 55
End: 2025-01-02
Payer: MEDICARE

## 2025-01-02 VITALS
RESPIRATION RATE: 19 BRPM | SYSTOLIC BLOOD PRESSURE: 146 MMHG | BODY MASS INDEX: 45.07 KG/M2 | HEART RATE: 65 BPM | TEMPERATURE: 98 F | HEIGHT: 64 IN | DIASTOLIC BLOOD PRESSURE: 94 MMHG | WEIGHT: 264 LBS | OXYGEN SATURATION: 99 %

## 2025-01-02 DIAGNOSIS — N39.41 URGE INCONTINENCE OF URINE: Primary | ICD-10-CM

## 2025-01-02 LAB
BILIRUB SERPL-MCNC: NORMAL MG/DL
BLOOD URINE, POC: NORMAL
COLOR, POC UA: YELLOW
GLUCOSE UR QL STRIP: NORMAL
KETONES UR QL STRIP: NORMAL
LEUKOCYTE ESTERASE URINE, POC: NORMAL
NITRITE, POC UA: NORMAL
PH, POC UA: 5.5
POC RESIDUAL URINE VOLUME: 32 ML (ref 0–100)
PROTEIN, POC: NORMAL
SPECIFIC GRAVITY, POC UA: >=1.03
UROBILINOGEN, POC UA: 0.2

## 2025-01-02 PROCEDURE — 51798 US URINE CAPACITY MEASURE: CPT | Mod: PBBFAC | Performed by: UROLOGY

## 2025-01-02 PROCEDURE — 99213 OFFICE O/P EST LOW 20 MIN: CPT | Mod: S$PBB,,, | Performed by: UROLOGY

## 2025-01-02 PROCEDURE — 3077F SYST BP >= 140 MM HG: CPT | Mod: CPTII,,, | Performed by: UROLOGY

## 2025-01-02 PROCEDURE — 99215 OFFICE O/P EST HI 40 MIN: CPT | Mod: PBBFAC,25 | Performed by: UROLOGY

## 2025-01-02 PROCEDURE — 3080F DIAST BP >= 90 MM HG: CPT | Mod: CPTII,,, | Performed by: UROLOGY

## 2025-01-02 PROCEDURE — 3008F BODY MASS INDEX DOCD: CPT | Mod: CPTII,,, | Performed by: UROLOGY

## 2025-01-02 PROCEDURE — 81001 URINALYSIS AUTO W/SCOPE: CPT | Mod: PBBFAC | Performed by: UROLOGY

## 2025-01-02 PROCEDURE — 1159F MED LIST DOCD IN RCRD: CPT | Mod: CPTII,,, | Performed by: UROLOGY

## 2025-01-02 RX ORDER — LIDOCAINE 50 MG/G
1 PATCH TOPICAL
COMMUNITY

## 2025-01-02 NOTE — PROGRESS NOTES
I saw and evaluated the patient with the resident.  I discussed with the resident and agree with the resident's history, physical, assessment, findings and care plan as documented in the resident's note.        Nery Lockwood DO  Urology  [facility]

## 2025-01-02 NOTE — PROGRESS NOTES
Patient seen by Dr. Grier & Dr. Deras. Patient instructed to RTC in 5 months with bladder scan. Bladder scan was 32 mL.

## 2025-01-02 NOTE — PROGRESS NOTES
CC:  Urge incontinence follow-up    HPI:  Greta Castillo is a 54 y.o. female here for f/u Botox injection.  She was having urinary frequency, urgency and urge incontinence. Myrbetriq provided no improvement. Was going to the restroom every hour day and night. Last Botox was 8/26/24. Today her bladder control is significantly improved, no longer having incontinence of any kind. Nighttime awakenings now down to one occurrence per night. Does not have voiding diary with her today.   She is a former patient of mine last seen prior to 2020. She had a TVT in 2011 and then had some problems with pulling sensation so a urethrolysis was done around 2014. She was having nocturia four to five times voiding small amounts as well as diurnal frequency and not feeling like the bladder is emptying. She has multiple sclerosis and had a bout of Guillain [place]. She was initially tried tamsulosin but she stopped taking that due to side effects.     Bladder scan residual:  32 ml    Urinalysis:  Results for orders placed or performed in visit on 01/02/25   POCT URINE DIPSTICK WITH MICROSCOPE, AUTOMATED   Result Value Ref Range    Color, UA Yellow     Spec Grav UA >=1.030     pH, UA 5.5     WBC, UA neg     Nitrite, UA neg     Protein, POC neg     Glucose, UA neg     Ketones, UA trace     Urobilinogen, UA 0.2     Bilirubin, POC neg     Blood, UA neg      ROS:  All systems reviewed and are negative except as documented in HPI and/or Assessment and Plan.     Patient Active Problem List:     Patient Active Problem List   Diagnosis    Sjogren's syndrome    Persistent insomnia    Positive antinuclear antibody    Multiple sclerosis    Spasticity    Rash    Peripheral nerve disease    Cerebrovascular accident    Migraine    Lumbar radiculopathy    Internal derangement of left knee    Gastroesophageal reflux disease    Foot pain    Disorder of hip region    Chronic pain syndrome    Bilateral carpal tunnel syndrome        Past Medical  History:  Past Medical History:   Diagnosis Date    Acid reflux     Anemia     Anxiety     Arthritis     Asthma     Depression     Hypertension     Stroke         Past Surgical History:  Past Surgical History:   Procedure Laterality Date    CARPAL TUNNEL RELEASE Left      SECTION      CYSTOCELE REPAIR      HYSTERECTOMY      KNEE SURGERY      TOTAL KNEE ARTHROPLASTY  2024    TUBAL LIGATION          Family History:  Family History   Problem Relation Name Age of Onset    Cancer Mother      Diabetes Mellitus Mother      Hypertension Mother      Cancer Father          Social History:  Social History     Socioeconomic History    Marital status:    Tobacco Use    Smoking status: Never     Passive exposure: Never    Smokeless tobacco: Never   Substance and Sexual Activity    Alcohol use: No    Drug use: Not Currently     Social Drivers of Health     Food Insecurity: No Food Insecurity (2023)    Received from [facility]    Hunger Vital Sign     Worried About Running Out of Food in the Last Year: Never true     Ran Out of Food in the Last Year: Never true   Transportation Needs: Not At Risk (2023)    Received from [facility]    Transportation Needs     In the past 12 months, has lack of reliable transportation kept you from medical appointments, meetings, work or from getting things needed for daily living?: No        Allergies:  Review of patient's allergies indicates:  No Known Allergies     Objective:  Vitals:    25 0907   BP: (!) 146/94   Pulse:    Resp:    Temp:      General:  Well developed, well nourished adult female in no acute distress  Abdomen: Soft, nontender, no masses  Extremities:  No clubbing, cyanosis, or edema  Neurologic:  Grossly intact  Musculoskeletal:  Normal tone    Assessment:  1. Urge incontinence of urine  - POCT URINE DIPSTICK WITH MICROSCOPE, AUTOMATED  - POCT Bladder Scan     Plan:  - Condition stable following  Botox on 8/26/24. Will withhold repeating Botox until symptoms return.     Return appointment:  5 months or sooner if needed for return of symptoms      Montrell Grier MD  [facility] PGY-III

## 2025-09-03 ENCOUNTER — TELEPHONE (OUTPATIENT)
Dept: OBSTETRICS AND GYNECOLOGY | Facility: CLINIC | Age: 55
End: 2025-09-03
Payer: MEDICARE